# Patient Record
Sex: MALE | Race: BLACK OR AFRICAN AMERICAN | NOT HISPANIC OR LATINO | Employment: UNEMPLOYED | URBAN - METROPOLITAN AREA
[De-identification: names, ages, dates, MRNs, and addresses within clinical notes are randomized per-mention and may not be internally consistent; named-entity substitution may affect disease eponyms.]

---

## 2023-06-15 ENCOUNTER — NURSING HOME VISIT (OUTPATIENT)
Dept: POST ACUTE CARE | Facility: EXTERNAL LOCATION | Age: 53
End: 2023-06-15
Payer: MEDICAID

## 2023-06-15 DIAGNOSIS — I10 BENIGN ESSENTIAL HTN: ICD-10-CM

## 2023-06-15 DIAGNOSIS — Z79.899 ON DEEP VEIN THROMBOSIS (DVT) PROPHYLAXIS: ICD-10-CM

## 2023-06-15 DIAGNOSIS — Z98.1 ENCOUNTER FOR POSTOPERATIVE CARE RELATED TO SURGICAL JOINT FUSION: ICD-10-CM

## 2023-06-15 DIAGNOSIS — Z98.890 STATUS POST SURGICAL MANIPULATION OF ANKLE JOINT: ICD-10-CM

## 2023-06-15 DIAGNOSIS — G47.30 SLEEP APNEA, UNSPECIFIED TYPE: ICD-10-CM

## 2023-06-15 DIAGNOSIS — R26.89 IMPAIRED GAIT AND MOBILITY: ICD-10-CM

## 2023-06-15 DIAGNOSIS — K59.00 CONSTIPATION, UNSPECIFIED CONSTIPATION TYPE: ICD-10-CM

## 2023-06-15 DIAGNOSIS — J45.909 ASTHMA, UNSPECIFIED ASTHMA SEVERITY, UNSPECIFIED WHETHER COMPLICATED, UNSPECIFIED WHETHER PERSISTENT (HHS-HCC): ICD-10-CM

## 2023-06-15 DIAGNOSIS — E11.9 TYPE 2 DIABETES MELLITUS WITHOUT COMPLICATION, WITHOUT LONG-TERM CURRENT USE OF INSULIN (MULTI): ICD-10-CM

## 2023-06-15 DIAGNOSIS — E66.01 MORBID OBESITY (MULTI): ICD-10-CM

## 2023-06-15 DIAGNOSIS — M25.571 RIGHT ANKLE PAIN, UNSPECIFIED CHRONICITY: ICD-10-CM

## 2023-06-15 DIAGNOSIS — I48.92 ATRIAL FLUTTER, UNSPECIFIED TYPE (MULTI): ICD-10-CM

## 2023-06-15 DIAGNOSIS — M14.671 CHARCOT ANKLE, RIGHT: Primary | ICD-10-CM

## 2023-06-15 DIAGNOSIS — M62.81 GENERALIZED MUSCLE WEAKNESS: ICD-10-CM

## 2023-06-15 DIAGNOSIS — Z48.89 ENCOUNTER FOR POSTOPERATIVE CARE RELATED TO SURGICAL JOINT FUSION: ICD-10-CM

## 2023-06-15 DIAGNOSIS — Z74.09 IMMOBILITY: ICD-10-CM

## 2023-06-15 PROCEDURE — 99310 SBSQ NF CARE HIGH MDM 45: CPT | Performed by: NURSE PRACTITIONER

## 2023-06-15 NOTE — LETTER
Patient: Lazarus Truong  : 1970    Encounter Date: 06/15/2023    Name: Lazarus Truong    YOB: 1970    Code Status: Full Code    Chief Complaint:    Initial visit; new patient;  Right Charcot ankle; S/P Right Charcot ankle reconstruction with application of external fixator; etc.......        HPI   52 year old male with medical history of type 2 diabetes, hypertension, atrial flutter,  asthma, sleep apnea and right foot Charcot joints.    HOSPITAL COURSE:  Patient was admitted  to  on 2023 for short stay 2 to 4 days for right Charcot ankle reconstruction surgery with   application of external fixation device.  Patient was recently diagnosed with atrial flutter; cardiologist cleared him for   surgical intervention and recommended monitoring for a few days; due to patient's comorbidities and change in   ambulatory status short stay for 2 to 4 days with a consult for PT OT with recommendations to discharge to rehab or SNF.    Patient is status post right Charcot ankle reconstruction with application of external fixator on 2023.  Right talectomy, tibio calcaneal arthrodesis. Estimated blood loss was 30 ml.   Weightbearing status is nonweightbearing to right foot.  Dressing should be left on clean dry and intact.  Nursing may reinforce outer dressing if strikethrough is noted.  DVT prophylaxis was started he was started on heparin while in-house and it is recommended that he continue anticoagulation upon discharge   for at least 2 months duration given obesity and physical limitations.  It was also recommended the patient use incentive spirometer.    Patient admitted to [facility] for skilled services today 6/15/2023.    Right Charcot ankle; S/P Right Charcot ankle reconstruction with application of external fixator on 2023.  Right talectomy, tibio calcaneal arthrodesis (acquired deformity); right foot pain:   Weightbearing status is nonweightbearing to right  foot.  Dressing should be left on clean dry and intact.    Nursing may reinforce outer dressing if strikethrough is noted.  DVT prophylaxis start heparin while in-house and it is recommended that he continue anticoagulation upon discharge   for at least 2 months duration given obesity and physical limitations.  Patient has decreased strength and decreased endurance.  Moderate intensity rehab  recommended after discharge- at [facility] skilled services.  Patient seen today.  He is sitting up in his bed.   His operative site is wrapped and external fixation device is in place.   Calm, cooperative, talkative, pleasant.   No complaints of right foot pain today.  No complaints of chest pain, headaches or dizziness today.    Morbid obesity (severe due to excess calories):   Patient's weight is 403#.     Type 2 diabetes:   On metformin.  No recent Ha1c available.    Hypertension:  On lisinopril and metoprolol.   Recent /58.  No complaints of chest pain, headaches or dizziness.     Atrial flutter:  On metoprolol and Eliquis.  No complaints of palpitations.     Asthma and sleep apnea:  Not on any medications for asthma.  On RA, not on oxygen.  No oxygen desaturations reported.     DVT prophylaxis:   On Eliquis.    Constipation:   On Colace.  No complaints of constipation today.    Immobility; impaired gait and mobility; generalized muscle weakness:  Patient is nonweightbearing.  Patient at [facility] skilled services, PT/OT.         ROS: 10 point ROS performed; Negative unless noted in HPI.    Social history:  Non-smoker  No alcohol  No illicit drugs  Currently employed as a     Surgical history:  Tendon surgery.    Family history:  Heart disease    Reviewed  hospital records from recent hospitalization.  Reviewed  EMR  Reviewed medical, social, surgical and family history.  Reviewed all current medications and performed medication reconciliation.  Performed  "prescription drug management.  Reviewed vital signs AND lab results  Reviewed [facility] Documentation  Discussed patient with nursing.   Spent greater than 45 minutes on visit.  Ordered RX for Percocet.        Lab Results   Component Value Date    WBC 7.3 06/29/2020    HGB 13.5 06/29/2020    HCT 43.1 06/29/2020     06/29/2020    CHOL 174 06/29/2020    TRIG 77 06/29/2020    HDL 66.9 06/29/2020    ALT 16 06/29/2020    AST 18 06/29/2020     06/29/2020    K 4.1 06/29/2020     06/29/2020    CREATININE 1.11 06/29/2020    BUN 22 06/29/2020    CO2 28 06/29/2020       /58   Pulse 67   Temp 35.7 °C (96.3 °F)   Resp 18   Ht 1.765 m (5' 9.5\")   Wt (!) 183 kg (403 lb)   SpO2 98%   BMI 58.66 kg/m²        Past Medical History:   Diagnosis Date   • Encounter for other preprocedural examination 07/02/2017    Pre-operative clearance   • ST elevation (STEMI) myocardial infarction involving other coronary artery of anterior wall (CMS/HCC)     Anterior myocardial infarction   • Unspecified open wound, unspecified foot, initial encounter 06/20/2017    Open wound of foot       Vitals  /58   Pulse 67   Temp 35.7 °C (96.3 °F)   Resp 18   Ht 1.765 m (5' 9.5\")   Wt (!) 183 kg (403 lb)   SpO2 98%   BMI 58.66 kg/m²   Body mass index is 58.66 kg/m².    Physical Exam  Gen: Alert, NAD, Morbid Obesity.  HEENT:  PERRLA, EOMI, conjunctiva and sclera normal in appearance. External auditory canals/TMs normal; Oral cavity and posterior pharynx without lesions/exudate  Neck:  Supple with FROM; No masses/nodes palpable; Thyroid nontender and without nodules;  Respiratory:  Lungs CTAB  Cardiovascular:  Heart RRR. No M/R/G. Peripheral pulses equal bilaterally  Abdomen:  Soft, nontender, BS present throughout; No R/G/R; No HSM or masses palpated  Extremities: Muscle strength grossly normal with good tone; right ankle/foot wrapped with external fixation device attached-DDI;   right foot toes < 3 second cap " refill;  Adequate sensation in toes on right foot; generalized muscle weakness; immobility.   Neuro:  CN II-XII intact; Reflexes 2+/2+; Gross motor and sensory intact  Skin:  No suspicious lesions present      Assessment/Plan     Right Charcot ankle; S/P Right Charcot ankle reconstruction with application of external fixator on 6/8/2023.  Right talectomy, tibio calcaneal arthrodesis (acquired deformity); right foot pain:   Weightbearing status nonweightbearing to right foot.  Dressing should be left on clean dry and intact.    Nursing may reinforce outer dressing if strikethrough is noted.  Moderate intensity rehab  recommended after discharge- at [facility] for skilled services.  Elevate right lower extremity.    Morbid obesity (severe due to excess calories):   Patient's weight is 403#.     Type 2 diabetes:   Continue metformin.  Order Ha1c.     Hypertension:  Continue lisinopril and metoprolol.   Monitor Bps.     Atrial flutter:  Continue metoprolol and Eliquis.    Asthma and sleep apnea:  Monitor oxygen saturations.   Monitor for cough, SOB.  Start Proair inhaler and/or albuterol nebs if needed.     DVT prophylaxis:   Continue Eliquis.    Constipation:   Continue Colace.  Monitor for constipation.     Immobility; impaired gait and mobility; generalized muscle weakness:  Patient nonweightbearing.  Continue at  [facility] for skilled services, PT/OT.         Problem List Items Addressed This Visit    None  Visit Diagnoses       Charcot ankle, right    -  Primary    Status post surgical manipulation of ankle joint        Encounter for postoperative care related to surgical joint fusion        Right ankle pain, unspecified chronicity        Morbid obesity (CMS/[facility])        Type 2 diabetes mellitus without complication, without long-term current use of insulin (CMS/[facility])        Benign essential HTN        Atrial flutter, unspecified type (CMS/[facility])        Asthma, unspecified asthma severity,  unspecified whether complicated, unspecified whether persistent        Sleep apnea, unspecified type        On deep vein thrombosis (DVT) prophylaxis        Constipation, unspecified constipation type        Immobility        Impaired gait and mobility        Generalized muscle weakness                TAMARA Judd       Electronically Signed By: TAMARA Judd   6/18/23  8:41 PM

## 2023-06-18 VITALS
WEIGHT: 315 LBS | OXYGEN SATURATION: 98 % | SYSTOLIC BLOOD PRESSURE: 121 MMHG | HEIGHT: 70 IN | BODY MASS INDEX: 45.1 KG/M2 | TEMPERATURE: 96.3 F | DIASTOLIC BLOOD PRESSURE: 58 MMHG | RESPIRATION RATE: 18 BRPM | HEART RATE: 67 BPM

## 2023-06-18 NOTE — PROGRESS NOTES
Name: Lazarus Truong    YOB: 1970    Code Status: Full Code    Chief Complaint:    Initial visit; new patient;  Right Charcot ankle; S/P Right Charcot ankle reconstruction with application of external fixator; etc.......        HPI   52 year old male with medical history of type 2 diabetes, hypertension, atrial flutter,  asthma, sleep apnea and right foot Charcot joints.    HOSPITAL COURSE:  Patient was admitted  to  on 6/8/2023 for short stay 2 to 4 days for right Charcot ankle reconstruction surgery with   application of external fixation device.  Patient was recently diagnosed with atrial flutter; cardiologist cleared him for   surgical intervention and recommended monitoring for a few days; due to patient's comorbidities and change in   ambulatory status short stay for 2 to 4 days with a consult for PT OT with recommendations to discharge to rehab or SNF.    Patient is status post right Charcot ankle reconstruction with application of external fixator on 6/8/2023.  Right talectomy, tibio calcaneal arthrodesis. Estimated blood loss was 30 ml.   Weightbearing status is nonweightbearing to right foot.  Dressing should be left on clean dry and intact.  Nursing may reinforce outer dressing if strikethrough is noted.  DVT prophylaxis was started he was started on heparin while in-house and it is recommended that he continue anticoagulation upon discharge   for at least 2 months duration given obesity and physical limitations.  It was also recommended the patient use incentive spirometer.    Patient admitted to [facility] skilled services today 6/15/2023.    Right Charcot ankle; S/P Right Charcot ankle reconstruction with application of external fixator on 6/8/2023.  Right talectomy, tibio calcaneal arthrodesis (acquired deformity); right foot pain:   Weightbearing status is nonweightbearing to right foot.  Dressing should be left on clean dry and intact.    Nursing may reinforce outer  dressing if strikethrough is noted.  DVT prophylaxis start heparin while in-house and it is recommended that he continue anticoagulation upon discharge   for at least 2 months duration given obesity and physical limitations.  Patient has decreased strength and decreased endurance.  Moderate intensity rehab  recommended after discharge- at [facility] for skilled services.  Patient seen today.  He is sitting up in his bed.   His operative site is wrapped and external fixation device is in place.   Calm, cooperative, talkative, pleasant.   No complaints of right foot pain today.  No complaints of chest pain, headaches or dizziness today.    Morbid obesity (severe due to excess calories):   Patient's weight is 403#.     Type 2 diabetes:   On metformin.  No recent Ha1c available.    Hypertension:  On lisinopril and metoprolol.   Recent /58.  No complaints of chest pain, headaches or dizziness.     Atrial flutter:  On metoprolol and Eliquis.  No complaints of palpitations.     Asthma and sleep apnea:  Not on any medications for asthma.  On RA, not on oxygen.  No oxygen desaturations reported.     DVT prophylaxis:   On Eliquis.    Constipation:   On Colace.  No complaints of constipation today.    Immobility; impaired gait and mobility; generalized muscle weakness:  Patient is nonweightbearing.  Patient at [facility] for skilled services, PT/OT.         ROS: 10 point ROS performed; Negative unless noted in HPI.    Social history:  Non-smoker  No alcohol  No illicit drugs  Currently employed as a     Surgical history:  Tendon surgery.    Family history:  Heart disease    Reviewed  hospital records from recent hospitalization.  Reviewed  EMR  Reviewed medical, social, surgical and family history.  Reviewed all current medications and performed medication reconciliation.  Performed prescription drug management.  Reviewed vital signs AND lab results  Reviewed [facility]  "Documentation  Discussed patient with nursing.   Spent greater than 45 minutes on visit.  Ordered RX for Percocet.        Lab Results   Component Value Date    WBC 7.3 06/29/2020    HGB 13.5 06/29/2020    HCT 43.1 06/29/2020     06/29/2020    CHOL 174 06/29/2020    TRIG 77 06/29/2020    HDL 66.9 06/29/2020    ALT 16 06/29/2020    AST 18 06/29/2020     06/29/2020    K 4.1 06/29/2020     06/29/2020    CREATININE 1.11 06/29/2020    BUN 22 06/29/2020    CO2 28 06/29/2020       /58   Pulse 67   Temp 35.7 °C (96.3 °F)   Resp 18   Ht 1.765 m (5' 9.5\")   Wt (!) 183 kg (403 lb)   SpO2 98%   BMI 58.66 kg/m²        Past Medical History:   Diagnosis Date    Encounter for other preprocedural examination 07/02/2017    Pre-operative clearance    ST elevation (STEMI) myocardial infarction involving other coronary artery of anterior wall (CMS/HCC)     Anterior myocardial infarction    Unspecified open wound, unspecified foot, initial encounter 06/20/2017    Open wound of foot       Vitals  /58   Pulse 67   Temp 35.7 °C (96.3 °F)   Resp 18   Ht 1.765 m (5' 9.5\")   Wt (!) 183 kg (403 lb)   SpO2 98%   BMI 58.66 kg/m²   Body mass index is 58.66 kg/m².    Physical Exam  Gen: Alert, NAD, Morbid Obesity.  HEENT:  PERRLA, EOMI, conjunctiva and sclera normal in appearance. External auditory canals/TMs normal; Oral cavity and posterior pharynx without lesions/exudate  Neck:  Supple with FROM; No masses/nodes palpable; Thyroid nontender and without nodules;  Respiratory:  Lungs CTAB  Cardiovascular:  Heart RRR. No M/R/G. Peripheral pulses equal bilaterally  Abdomen:  Soft, nontender, BS present throughout; No R/G/R; No HSM or masses palpated  Extremities: Muscle strength grossly normal with good tone; right ankle/foot wrapped with external fixation device attached-DDI;   right foot toes < 3 second cap refill;  Adequate sensation in toes on right foot; generalized muscle weakness; immobility. "   Neuro:  CN II-XII intact; Reflexes 2+/2+; Gross motor and sensory intact  Skin:  No suspicious lesions present      Assessment/Plan      Right Charcot ankle; S/P Right Charcot ankle reconstruction with application of external fixator on 6/8/2023.  Right talectomy, tibio calcaneal arthrodesis (acquired deformity); right foot pain:   Weightbearing status nonweightbearing to right foot.  Dressing should be left on clean dry and intact.    Nursing may reinforce outer dressing if strikethrough is noted.  Moderate intensity rehab  recommended after discharge- at [facility] for skilled services.  Elevate right lower extremity.    Morbid obesity (severe due to excess calories):   Patient's weight is 403#.     Type 2 diabetes:   Continue metformin.  Order Ha1c.     Hypertension:  Continue lisinopril and metoprolol.   Monitor Bps.     Atrial flutter:  Continue metoprolol and Eliquis.    Asthma and sleep apnea:  Monitor oxygen saturations.   Monitor for cough, SOB.  Start Proair inhaler and/or albuterol nebs if needed.     DVT prophylaxis:   Continue Eliquis.    Constipation:   Continue Colace.  Monitor for constipation.     Immobility; impaired gait and mobility; generalized muscle weakness:  Patient nonweightbearing.  Continue at  [facility] for skilled services, PT/OT.         Problem List Items Addressed This Visit    None  Visit Diagnoses       Charcot ankle, right    -  Primary    Status post surgical manipulation of ankle joint        Encounter for postoperative care related to surgical joint fusion        Right ankle pain, unspecified chronicity        Morbid obesity (CMS/[facility])        Type 2 diabetes mellitus without complication, without long-term current use of insulin (CMS/[facility])        Benign essential HTN        Atrial flutter, unspecified type (CMS/[facility])        Asthma, unspecified asthma severity, unspecified whether complicated, unspecified whether persistent        Sleep apnea,  unspecified type        On deep vein thrombosis (DVT) prophylaxis        Constipation, unspecified constipation type        Immobility        Impaired gait and mobility        Generalized muscle weakness                Komal Burnette, APRN-CNP

## 2023-06-20 ENCOUNTER — NURSING HOME VISIT (OUTPATIENT)
Dept: POST ACUTE CARE | Facility: EXTERNAL LOCATION | Age: 53
End: 2023-06-20
Payer: MEDICAID

## 2023-06-20 DIAGNOSIS — Z48.89 ENCOUNTER FOR POSTOPERATIVE CARE RELATED TO SURGICAL JOINT FUSION: ICD-10-CM

## 2023-06-20 DIAGNOSIS — I48.92 ATRIAL FLUTTER, UNSPECIFIED TYPE (MULTI): ICD-10-CM

## 2023-06-20 DIAGNOSIS — Z74.09 IMMOBILITY: ICD-10-CM

## 2023-06-20 DIAGNOSIS — K59.00 CONSTIPATION, UNSPECIFIED CONSTIPATION TYPE: ICD-10-CM

## 2023-06-20 DIAGNOSIS — R19.7 DIARRHEA, UNSPECIFIED TYPE: ICD-10-CM

## 2023-06-20 DIAGNOSIS — G47.30 SLEEP APNEA, UNSPECIFIED TYPE: ICD-10-CM

## 2023-06-20 DIAGNOSIS — E11.9 TYPE 2 DIABETES MELLITUS WITHOUT COMPLICATION, WITHOUT LONG-TERM CURRENT USE OF INSULIN (MULTI): ICD-10-CM

## 2023-06-20 DIAGNOSIS — Z79.899 ON DEEP VEIN THROMBOSIS (DVT) PROPHYLAXIS: ICD-10-CM

## 2023-06-20 DIAGNOSIS — M25.571 RIGHT ANKLE PAIN, UNSPECIFIED CHRONICITY: ICD-10-CM

## 2023-06-20 DIAGNOSIS — I10 BENIGN ESSENTIAL HTN: Primary | ICD-10-CM

## 2023-06-20 DIAGNOSIS — Z98.1 ENCOUNTER FOR POSTOPERATIVE CARE RELATED TO SURGICAL JOINT FUSION: ICD-10-CM

## 2023-06-20 DIAGNOSIS — R26.89 IMPAIRED GAIT AND MOBILITY: ICD-10-CM

## 2023-06-20 DIAGNOSIS — J45.909 ASTHMA, UNSPECIFIED ASTHMA SEVERITY, UNSPECIFIED WHETHER COMPLICATED, UNSPECIFIED WHETHER PERSISTENT (HHS-HCC): ICD-10-CM

## 2023-06-20 DIAGNOSIS — Z98.890 STATUS POST SURGICAL MANIPULATION OF ANKLE JOINT: ICD-10-CM

## 2023-06-20 DIAGNOSIS — M62.81 GENERALIZED MUSCLE WEAKNESS: ICD-10-CM

## 2023-06-20 DIAGNOSIS — M14.671 CHARCOT ANKLE, RIGHT: ICD-10-CM

## 2023-06-20 DIAGNOSIS — E66.01 MORBID OBESITY (MULTI): ICD-10-CM

## 2023-06-20 PROCEDURE — 99309 SBSQ NF CARE MODERATE MDM 30: CPT | Performed by: NURSE PRACTITIONER

## 2023-06-20 NOTE — LETTER
Patient: Lazarus Truong  : 1970    Encounter Date: 2023    Name: Lazarus Truong    YOB: 1970    Code Status: Full Code    Chief Complaint:    Benign essential HTN;  etc.......        HPI   52 year old male with medical history of type 2 diabetes, hypertension, atrial flutter,  asthma, sleep apnea and right foot Charcot joints.    Patient admitted to [facility] for skilled services today 6/15/2023.    Benign essential Hypertension:  On lisinopril and metoprolol.   Recent /68.  No complaints of chest pain, headaches or dizziness.   Patient seen today.  He is up in his wheelchair.  Calm, cooperative, talkative, pleasant.   No complaints of chest pain, headaches or dizziness today.  A x O x 3.      Atrial flutter:  On metoprolol and Eliquis.  No complaints of palpitations.   Patient was recently diagnosed with a-flutter during his hospitalization.  No complaints of chest pain.   No dizziness.    Right Charcot ankle; S/P Right Charcot ankle reconstruction with application of external fixator on 2023.  Right talectomy, tibio calcaneal arthrodesis (acquired deformity); right foot pain:   Weightbearing status is nonweightbearing to right foot.  Dressing should be left on clean dry and intact.    At [facility] for skilled services.  His operative site is wrapped and external fixation device is in place.   No complaints of right foot pain today.    Morbid obesity (severe due to excess calories):   Patient's weight is 403#.     Type 2 diabetes; diarrhea:   On metformin.  No recent Ha1c available.  Patient states he is having some diarrhea, he thinks it might be related to his metformin.   Reminded patient to not take any Colace if he is having diarrhea.  He is not having any vomiting or nausea.     Asthma; sleep apnea:  Not on any medications for asthma.  On RA, not on oxygen.  No oxygen desaturations reported.   No increased SOB reported.    DVT prophylaxis:  "  On Eliquis.    Constipation:   On Colace PRN.  No complaints of constipation today.    Impaired gait and mobility; Immobility; generalized muscle weakness:  Patient is nonweightbearing.  Patient at [facility] for skilled services, PT/OT.         ROS: 10 point ROS performed; Negative unless noted in HPI.    Social history:  Non-smoker  No alcohol  No illicit drugs  Currently employed as a     Surgical history:  Tendon surgery.    Family history:  Heart disease    Reviewed  hospital records from recent hospitalization.  Reviewed  EMR  Reviewed medical, social, surgical and family history.  Reviewed all current medications and performed medication reconciliation.  Performed prescription drug management.  Reviewed vital signs AND lab results  Reviewed [facility] Documentation  Discussed patient with nursing.        Lab Results   Component Value Date    WBC 7.3 06/29/2020    HGB 13.5 06/29/2020    HCT 43.1 06/29/2020     06/29/2020    CHOL 174 06/29/2020    TRIG 77 06/29/2020    HDL 66.9 06/29/2020    ALT 16 06/29/2020    AST 18 06/29/2020     06/29/2020    K 4.1 06/29/2020     06/29/2020    CREATININE 1.11 06/29/2020    BUN 22 06/29/2020    CO2 28 06/29/2020       /68   Pulse 84   Temp 36.2 °C (97.2 °F)   Ht 1.765 m (5' 9.5\")   Wt (!) 183 kg (403 lb)   BMI 58.66 kg/m²        Past Medical History:   Diagnosis Date   • Encounter for other preprocedural examination 07/02/2017    Pre-operative clearance   • ST elevation (STEMI) myocardial infarction involving other coronary artery of anterior wall (CMS/HCC)     Anterior myocardial infarction   • Unspecified open wound, unspecified foot, initial encounter 06/20/2017    Open wound of foot       Vitals  /68   Pulse 84   Temp 36.2 °C (97.2 °F)   Ht 1.765 m (5' 9.5\")   Wt (!) 183 kg (403 lb)   BMI 58.66 kg/m²   Body mass index is 58.66 kg/m².    Physical Exam  Gen: Alert, NAD, Morbid Obesity.  HEENT: "  PERRLA, EOMI, conjunctiva and sclera normal in appearance. External auditory canals/TMs normal; Oral cavity and posterior pharynx without lesions/exudate  Neck:  Supple with FROM; No masses/nodes palpable; Thyroid nontender and without nodules;  Respiratory:  Lungs CTAB  Cardiovascular:  Heart RRR. No M/R/G. Peripheral pulses equal bilaterally  Abdomen:  Soft, nontender, BS present throughout; No R/G/R; No HSM or masses palpated  Extremities: Muscle strength grossly normal with good tone; right ankle/foot wrapped with external fixation device attached-DDI;   right foot toes < 3 second cap refill;  Adequate sensation in toes on right foot; generalized muscle weakness; immobility.   Neuro:  CN II-XII intact; Reflexes 2+/2+; Gross motor and sensory intact  Skin:  No suspicious lesions present      Assessment/Plan     Recent labs pending----follow up when available.     Benign essential Hypertension:  Continue lisinopril and metoprolol.   Monitor Bps.    Atrial flutter:  Continue metoprolol and Eliquis.  Follow up with cardiology as planned.     Right Charcot ankle; S/P Right Charcot ankle reconstruction with application of external fixator on 6/8/2023.  Right talectomy, tibio calcaneal arthrodesis (acquired deformity); right foot pain:   Weightbearing status is nonweightbearing to right foot.  Dressing should be left on clean dry and intact.    Elevate right leg as tolerated.   Continue at  [facility] for skilled services.   Follow up with orthopedic doctor as planned.     Morbid obesity (severe due to excess calories):   Monitor weights.    Type 2 diabetes; diarrhea:   Continue metformin.  Order Ha1c   If diarrhea not improved in 1-2 days, reduce metformin dose.   Reminded patient to not take any Colace if he is having diarrhea.    Asthma; sleep apnea:  Monitor oxygen sats.  Start oxygen via NC 2L for sats < 93%  Start Proair inhaler for cough, wheezing, dyspnea.     DVT prophylaxis:   Continue  "Eliquis.    Constipation:   Continue Colace PRN.  No complaints of constipation today.    Impaired gait and mobility; Immobility;generalized muscle weakness:  Patient is nonweightbearing.  Patient at [facility] services, PT/OT.   Fall prevention strategies.       Problem List Items Addressed This Visit    None  Visit Diagnoses       Benign essential HTN    -  Primary    Atrial flutter, unspecified type (CMS/HCC)        Charcot ankle, right        Status post surgical manipulation of ankle joint        Encounter for postoperative care related to surgical joint fusion        Right ankle pain, unspecified chronicity        Morbid obesity (CMS/[facility])        Type 2 diabetes mellitus without complication, without long-term current use of insulin (CMS/[facility])        Diarrhea, unspecified type        Asthma, unspecified asthma severity, unspecified whether complicated, unspecified whether persistent        Sleep apnea, unspecified type        On deep vein thrombosis (DVT) prophylaxis        Constipation, unspecified constipation type        Impaired gait and mobility        Immobility        Generalized muscle weakness                                      ROS: 10 point ROS performed; Negative unless noted in HPI.       Lab Results   Component Value Date    WBC 7.3 06/29/2020    HGB 13.5 06/29/2020    HCT 43.1 06/29/2020     06/29/2020    CHOL 174 06/29/2020    TRIG 77 06/29/2020    HDL 66.9 06/29/2020    ALT 16 06/29/2020    AST 18 06/29/2020     06/29/2020    K 4.1 06/29/2020     06/29/2020    CREATININE 1.11 06/29/2020    BUN 22 06/29/2020    CO2 28 06/29/2020             /68   Pulse 84   Temp 36.2 °C (97.2 °F)   Ht 1.765 m (5' 9.5\")   Wt (!) 183 kg (403 lb)   BMI 58.66 kg/m²      Physical Exam     Assessment/Plan   Problem List Items Addressed This Visit    None  Visit Diagnoses       Benign essential HTN    -  Primary    Atrial flutter, unspecified type (CMS/HCC)        " Charcot ankle, right        Status post surgical manipulation of ankle joint        Encounter for postoperative care related to surgical joint fusion        Right ankle pain, unspecified chronicity        Morbid obesity (CMS/[facility])        Type 2 diabetes mellitus without complication, without long-term current use of insulin (CMS/[facility])        Diarrhea, unspecified type        Asthma, unspecified asthma severity, unspecified whether complicated, unspecified whether persistent        Sleep apnea, unspecified type        On deep vein thrombosis (DVT) prophylaxis        Constipation, unspecified constipation type        Impaired gait and mobility        Immobility        Generalized muscle weakness                TAMARA Judd       Electronically Signed By: TAMARA Judd   6/23/23  5:31 PM

## 2023-06-23 VITALS
WEIGHT: 315 LBS | BODY MASS INDEX: 45.1 KG/M2 | HEIGHT: 70 IN | SYSTOLIC BLOOD PRESSURE: 110 MMHG | HEART RATE: 84 BPM | DIASTOLIC BLOOD PRESSURE: 68 MMHG | TEMPERATURE: 97.2 F

## 2023-06-23 NOTE — PROGRESS NOTES
Name: Lazarus Truong    YOB: 1970    Code Status: Full Code    Chief Complaint:    Benign essential HTN;  etc.......        HPI   52 year old male with medical history of type 2 diabetes, hypertension, atrial flutter,  asthma, sleep apnea and right foot Charcot joints.    Patient admitted to [facility] for skilled services today 6/15/2023.    Benign essential Hypertension:  On lisinopril and metoprolol.   Recent /68.  No complaints of chest pain, headaches or dizziness.   Patient seen today.  He is up in his wheelchair.  Calm, cooperative, talkative, pleasant.   No complaints of chest pain, headaches or dizziness today.  A x O x 3.      Atrial flutter:  On metoprolol and Eliquis.  No complaints of palpitations.   Patient was recently diagnosed with a-flutter during his hospitalization.  No complaints of chest pain.   No dizziness.    Right Charcot ankle; S/P Right Charcot ankle reconstruction with application of external fixator on 6/8/2023.  Right talectomy, tibio calcaneal arthrodesis (acquired deformity); right foot pain:   Weightbearing status is nonweightbearing to right foot.  Dressing should be left on clean dry and intact.    At [facility] for skilled services.  His operative site is wrapped and external fixation device is in place.   No complaints of right foot pain today.    Morbid obesity (severe due to excess calories):   Patient's weight is 403#.     Type 2 diabetes; diarrhea:   On metformin.  No recent Ha1c available.  Patient states he is having some diarrhea, he thinks it might be related to his metformin.   Reminded patient to not take any Colace if he is having diarrhea.  He is not having any vomiting or nausea.     Asthma; sleep apnea:  Not on any medications for asthma.  On RA, not on oxygen.  No oxygen desaturations reported.   No increased SOB reported.    DVT prophylaxis:   On Eliquis.    Constipation:   On Colace PRN.  No complaints of  "constipation today.    Impaired gait and mobility; Immobility; generalized muscle weakness:  Patient is nonweightbearing.  Patient at [facility] for [facility] services, PT/OT.         ROS: 10 point ROS performed; Negative unless noted in HPI.    Social history:  Non-smoker  No alcohol  No illicit drugs  Currently employed as a     Surgical history:  Tendon surgery.    Family history:  Heart disease    Reviewed  hospital records from recent hospitalization.  Reviewed  EMR  Reviewed medical, social, surgical and family history.  Reviewed all current medications and performed medication reconciliation.  Performed prescription drug management.  Reviewed vital signs AND lab results  Reviewed [facility] Documentation  Discussed patient with nursing.        Lab Results   Component Value Date    WBC 7.3 06/29/2020    HGB 13.5 06/29/2020    HCT 43.1 06/29/2020     06/29/2020    CHOL 174 06/29/2020    TRIG 77 06/29/2020    HDL 66.9 06/29/2020    ALT 16 06/29/2020    AST 18 06/29/2020     06/29/2020    K 4.1 06/29/2020     06/29/2020    CREATININE 1.11 06/29/2020    BUN 22 06/29/2020    CO2 28 06/29/2020       /68   Pulse 84   Temp 36.2 °C (97.2 °F)   Ht 1.765 m (5' 9.5\")   Wt (!) 183 kg (403 lb)   BMI 58.66 kg/m²        Past Medical History:   Diagnosis Date    Encounter for other preprocedural examination 07/02/2017    Pre-operative clearance    ST elevation (STEMI) myocardial infarction involving other coronary artery of anterior wall (CMS/HCC)     Anterior myocardial infarction    Unspecified open wound, unspecified foot, initial encounter 06/20/2017    Open wound of foot       Vitals  /68   Pulse 84   Temp 36.2 °C (97.2 °F)   Ht 1.765 m (5' 9.5\")   Wt (!) 183 kg (403 lb)   BMI 58.66 kg/m²   Body mass index is 58.66 kg/m².    Physical Exam  Gen: Alert, NAD, Morbid Obesity.  HEENT:  PERRLA, EOMI, conjunctiva and sclera normal in appearance. External " auditory canals/TMs normal; Oral cavity and posterior pharynx without lesions/exudate  Neck:  Supple with FROM; No masses/nodes palpable; Thyroid nontender and without nodules;  Respiratory:  Lungs CTAB  Cardiovascular:  Heart RRR. No M/R/G. Peripheral pulses equal bilaterally  Abdomen:  Soft, nontender, BS present throughout; No R/G/R; No HSM or masses palpated  Extremities: Muscle strength grossly normal with good tone; right ankle/foot wrapped with external fixation device attached-DDI;   right foot toes < 3 second cap refill;  Adequate sensation in toes on right foot; generalized muscle weakness; immobility.   Neuro:  CN II-XII intact; Reflexes 2+/2+; Gross motor and sensory intact  Skin:  No suspicious lesions present      Assessment/Plan      Recent labs pending----follow up when available.     Benign essential Hypertension:  Continue lisinopril and metoprolol.   Monitor Bps.    Atrial flutter:  Continue metoprolol and Eliquis.  Follow up with cardiology as planned.     Right Charcot ankle; S/P Right Charcot ankle reconstruction with application of external fixator on 6/8/2023.  Right talectomy, tibio calcaneal arthrodesis (acquired deformity); right foot pain:   Weightbearing status is nonweightbearing to right foot.  Dressing should be left on clean dry and intact.    Elevate right leg as tolerated.   Continue at  [facility] for skilled services.   Follow up with orthopedic doctor as planned.     Morbid obesity (severe due to excess calories):   Monitor weights.    Type 2 diabetes; diarrhea:   Continue metformin.  Order Ha1c   If diarrhea not improved in 1-2 days, reduce metformin dose.   Reminded patient to not take any Colace if he is having diarrhea.    Asthma; sleep apnea:  Monitor oxygen sats.  Start oxygen via NC 2L for sats < 93%  Start Proair inhaler for cough, wheezing, dyspnea.     DVT prophylaxis:   Continue Eliquis.    Constipation:   Continue Colace PRN.  No complaints of  "constipation today.    Impaired gait and mobility; Immobility;generalized muscle weakness:  Patient is nonweightbearing.  Patient at [facility], PT/OT.   Fall prevention strategies.       Problem List Items Addressed This Visit    None  Visit Diagnoses       Benign essential HTN    -  Primary    Atrial flutter, unspecified type (CMS/[facility])        Charcot ankle, right        Status post surgical manipulation of ankle joint        Encounter for postoperative care related to surgical joint fusion        Right ankle pain, unspecified chronicity        Morbid obesity (CMS/[facility])        Type 2 diabetes mellitus without complication, without long-term current use of insulin (CMS/[facility])        Diarrhea, unspecified type        Asthma, unspecified asthma severity, unspecified whether complicated, unspecified whether persistent        Sleep apnea, unspecified type        On deep vein thrombosis (DVT) prophylaxis        Constipation, unspecified constipation type        Impaired gait and mobility        Immobility        Generalized muscle weakness                                      ROS: 10 point ROS performed; Negative unless noted in HPI.       Lab Results   Component Value Date    WBC 7.3 06/29/2020    HGB 13.5 06/29/2020    HCT 43.1 06/29/2020     06/29/2020    CHOL 174 06/29/2020    TRIG 77 06/29/2020    HDL 66.9 06/29/2020    ALT 16 06/29/2020    AST 18 06/29/2020     06/29/2020    K 4.1 06/29/2020     06/29/2020    CREATININE 1.11 06/29/2020    BUN 22 06/29/2020    CO2 28 06/29/2020             /68   Pulse 84   Temp 36.2 °C (97.2 °F)   Ht 1.765 m (5' 9.5\")   Wt (!) 183 kg (403 lb)   BMI 58.66 kg/m²      Physical Exam     Assessment/Plan    Problem List Items Addressed This Visit    None  Visit Diagnoses       Benign essential HTN    -  Primary    Atrial flutter, unspecified type (CMS/[facility])        Charcot ankle, right        Status post surgical manipulation of " ankle joint        Encounter for postoperative care related to surgical joint fusion        Right ankle pain, unspecified chronicity        Morbid obesity (CMS/[facility])        Type 2 diabetes mellitus without complication, without long-term current use of insulin (CMS/[facility])        Diarrhea, unspecified type        Asthma, unspecified asthma severity, unspecified whether complicated, unspecified whether persistent        Sleep apnea, unspecified type        On deep vein thrombosis (DVT) prophylaxis        Constipation, unspecified constipation type        Impaired gait and mobility        Immobility        Generalized muscle weakness                Komal Burnette, APRN-CNP

## 2023-08-10 ENCOUNTER — HOSPITAL ENCOUNTER (OUTPATIENT)
Dept: DATA CONVERSION | Facility: HOSPITAL | Age: 53
Discharge: HOME | End: 2023-08-10

## 2023-08-10 DIAGNOSIS — M21.6X1 OTHER ACQUIRED DEFORMITIES OF RIGHT FOOT: ICD-10-CM

## 2023-08-25 ENCOUNTER — HOSPITAL ENCOUNTER (OUTPATIENT)
Dept: DATA CONVERSION | Facility: HOSPITAL | Age: 53
Discharge: HOME | End: 2023-08-25
Payer: MEDICAID

## 2023-08-25 DIAGNOSIS — M14.671 CHARCOT'S JOINT, RIGHT ANKLE AND FOOT: ICD-10-CM

## 2023-09-11 ENCOUNTER — HOSPITAL ENCOUNTER (OUTPATIENT)
Dept: DATA CONVERSION | Facility: HOSPITAL | Age: 53
End: 2023-09-11

## 2023-09-11 DIAGNOSIS — M21.6X1 OTHER ACQUIRED DEFORMITIES OF RIGHT FOOT: ICD-10-CM

## 2023-09-11 DIAGNOSIS — M14.671 CHARCOT'S JOINT, RIGHT ANKLE AND FOOT: ICD-10-CM

## 2023-09-11 DIAGNOSIS — T84.84XD PAIN DUE TO INTERNAL ORTHOPEDIC PROSTHETIC DEVICES, IMPLANTS AND GRAFTS, SUBSEQUENT ENCOUNTER: ICD-10-CM

## 2023-11-06 NOTE — DISCHARGE INSTRUCTIONS
PODIATRIC SURGERY POST-OP INSTRUCTIONS    YOU HAD ANESTHESIA:  You must have a responsible adult drive you home and stay with you for the first 24 hours.    Do not drive a car or drink any alcohol for 24 hours after surgery.  Do not do any strenuous work or activity for the next 24 hours.  You may have mild nausea, a sore throat or raspy voice for a few days.      POST-OP INSTRUCTIONS:  Keep dressing clean, dry and intact until your first post-operative appointment.  Do not remove surgical dressing. You may need to loosen if necessary.   Do not shower unless using a cast protector to protect dressing.  If dressing gets wet, please call office immediately as this can lead to increased risk of infection or wound dehiscence.   Elevate surgical extremity as much as possible to help with pain and swelling.  Place ice pack behind knee of surgical extremity (20 minutes on/20 minutes off while awake). After 24 hours use ice behind the knee as needed for comfort.  Weight Bearing Status: Please remain non-weight bearing to the surgical extremity with compression dressing in place utilizing wheelchair.  Please resume all home medications.   Post-Operative Medications: You have Percocet prescribed to you for pain control; please take as directed on the bottle.   Post-operative appointment with Dr. Savage for 11/16/23 @ 9:30 AM at the [place] office. Please call to schedule if not already scheduled.  Should any problems, questions, or concerns arise, please call the clinic office.    WHEN TO CALL YOUR DOCTOR:  Fever (>100.4°F or 38.0°C) or chills.  Incision problems such as redness, warmth, swelling, or foul smelling drainage.  Severe nausea or persistent vomiting.  Pain and swelling in your legs, especially if it is only on one side and not the other.  Pain with urination, cloudy urine, or foul smelling urine.  Or if you have any other problems or questions.  CALL 911 or go to the ED if you have any shortness of breath,  difficulty breathing, or chest pain.

## 2023-11-08 ENCOUNTER — ANESTHESIA EVENT (OUTPATIENT)
Dept: OPERATING ROOM | Facility: HOSPITAL | Age: 53
End: 2023-11-08
Payer: MEDICAID

## 2023-11-08 ENCOUNTER — HOSPITAL ENCOUNTER (OUTPATIENT)
Facility: HOSPITAL | Age: 53
Setting detail: OUTPATIENT SURGERY
Discharge: HOME | End: 2023-11-08
Attending: PODIATRIST | Admitting: PODIATRIST
Payer: MEDICAID

## 2023-11-08 ENCOUNTER — ANESTHESIA (OUTPATIENT)
Dept: OPERATING ROOM | Facility: HOSPITAL | Age: 53
End: 2023-11-08
Payer: MEDICAID

## 2023-11-08 VITALS
HEART RATE: 83 BPM | OXYGEN SATURATION: 100 % | DIASTOLIC BLOOD PRESSURE: 59 MMHG | TEMPERATURE: 97.7 F | SYSTOLIC BLOOD PRESSURE: 103 MMHG | RESPIRATION RATE: 16 BRPM

## 2023-11-08 PROBLEM — I48.91 ATRIAL FIBRILLATION (MULTI): Status: ACTIVE | Noted: 2023-11-08

## 2023-11-08 PROBLEM — E66.9 OBESITY: Status: ACTIVE | Noted: 2023-11-08

## 2023-11-08 PROBLEM — J45.909 ASTHMA (HHS-HCC): Status: ACTIVE | Noted: 2023-11-08

## 2023-11-08 PROBLEM — I25.10 CAD (CORONARY ARTERY DISEASE): Status: ACTIVE | Noted: 2023-11-08

## 2023-11-08 PROBLEM — E10.9 DIABETES MELLITUS TYPE 1 (MULTI): Status: ACTIVE | Noted: 2023-11-08

## 2023-11-08 LAB — GLUCOSE BLD MANUAL STRIP-MCNC: 128 MG/DL (ref 74–99)

## 2023-11-08 PROCEDURE — A20694 PR REMOVE EXTERN BONE FIX DEV W ANESTH: Performed by: NURSE ANESTHETIST, CERTIFIED REGISTERED

## 2023-11-08 PROCEDURE — 7100000001 HC RECOVERY ROOM TIME - INITIAL BASE CHARGE: Performed by: PODIATRIST

## 2023-11-08 PROCEDURE — 7100000010 HC PHASE TWO TIME - EACH INCREMENTAL 1 MINUTE: Performed by: PODIATRIST

## 2023-11-08 PROCEDURE — 2500000001 HC RX 250 WO HCPCS SELF ADMINISTERED DRUGS (ALT 637 FOR MEDICARE OP): Performed by: ANESTHESIOLOGY

## 2023-11-08 PROCEDURE — A20694 PR REMOVE EXTERN BONE FIX DEV W ANESTH: Performed by: ANESTHESIOLOGY

## 2023-11-08 PROCEDURE — 2500000004 HC RX 250 GENERAL PHARMACY W/ HCPCS (ALT 636 FOR OP/ED)

## 2023-11-08 PROCEDURE — 7100000009 HC PHASE TWO TIME - INITIAL BASE CHARGE: Performed by: PODIATRIST

## 2023-11-08 PROCEDURE — 3700000001 HC GENERAL ANESTHESIA TIME - INITIAL BASE CHARGE: Performed by: PODIATRIST

## 2023-11-08 PROCEDURE — 3700000002 HC GENERAL ANESTHESIA TIME - EACH INCREMENTAL 1 MINUTE: Performed by: PODIATRIST

## 2023-11-08 PROCEDURE — 3600000003 HC OR TIME - INITIAL BASE CHARGE - PROCEDURE LEVEL THREE: Performed by: PODIATRIST

## 2023-11-08 PROCEDURE — 7100000002 HC RECOVERY ROOM TIME - EACH INCREMENTAL 1 MINUTE: Performed by: PODIATRIST

## 2023-11-08 PROCEDURE — 3600000008 HC OR TIME - EACH INCREMENTAL 1 MINUTE - PROCEDURE LEVEL THREE: Performed by: PODIATRIST

## 2023-11-08 PROCEDURE — 2500000001 HC RX 250 WO HCPCS SELF ADMINISTERED DRUGS (ALT 637 FOR MEDICARE OP): Performed by: PODIATRIST

## 2023-11-08 PROCEDURE — 82947 ASSAY GLUCOSE BLOOD QUANT: CPT

## 2023-11-08 PROCEDURE — 2500000004 HC RX 250 GENERAL PHARMACY W/ HCPCS (ALT 636 FOR OP/ED): Performed by: NURSE ANESTHETIST, CERTIFIED REGISTERED

## 2023-11-08 PROCEDURE — 2720000007 HC OR 272 NO HCPCS: Performed by: PODIATRIST

## 2023-11-08 RX ORDER — DIPHENHYDRAMINE HYDROCHLORIDE 50 MG/ML
12.5 INJECTION INTRAMUSCULAR; INTRAVENOUS ONCE AS NEEDED
Status: DISCONTINUED | OUTPATIENT
Start: 2023-11-08 | End: 2023-11-08 | Stop reason: HOSPADM

## 2023-11-08 RX ORDER — DOCUSATE SODIUM 100 MG/1
100 CAPSULE, LIQUID FILLED ORAL 2 TIMES DAILY PRN
COMMUNITY

## 2023-11-08 RX ORDER — GUAIFENESIN 100 MG/5ML
200 SOLUTION ORAL 3 TIMES DAILY PRN
Status: ON HOLD | COMMUNITY
End: 2024-01-10 | Stop reason: ALTCHOICE

## 2023-11-08 RX ORDER — BACITRACIN 500 [USP'U]/G
OINTMENT TOPICAL AS NEEDED
Status: DISCONTINUED | OUTPATIENT
Start: 2023-11-08 | End: 2023-11-08 | Stop reason: HOSPADM

## 2023-11-08 RX ORDER — LISINOPRIL 5 MG/1
5 TABLET ORAL DAILY
COMMUNITY

## 2023-11-08 RX ORDER — FENTANYL CITRATE 50 UG/ML
25 INJECTION, SOLUTION INTRAMUSCULAR; INTRAVENOUS EVERY 5 MIN PRN
Status: DISCONTINUED | OUTPATIENT
Start: 2023-11-08 | End: 2023-11-08 | Stop reason: HOSPADM

## 2023-11-08 RX ORDER — MIDAZOLAM HYDROCHLORIDE 1 MG/ML
INJECTION, SOLUTION INTRAMUSCULAR; INTRAVENOUS AS NEEDED
Status: DISCONTINUED | OUTPATIENT
Start: 2023-11-08 | End: 2023-11-08

## 2023-11-08 RX ORDER — OXYCODONE AND ACETAMINOPHEN 5; 325 MG/1; MG/1
1 TABLET ORAL EVERY 4 HOURS PRN
Status: ON HOLD | COMMUNITY
End: 2024-01-10 | Stop reason: ALTCHOICE

## 2023-11-08 RX ORDER — METOCLOPRAMIDE 10 MG/1
10 TABLET ORAL ONCE
Status: COMPLETED | OUTPATIENT
Start: 2023-11-08 | End: 2023-11-08

## 2023-11-08 RX ORDER — METFORMIN HYDROCHLORIDE EXTENDED-RELEASE TABLETS 500 MG/1
500 TABLET, FILM COATED, EXTENDED RELEASE ORAL
COMMUNITY

## 2023-11-08 RX ORDER — HYDRALAZINE HYDROCHLORIDE 20 MG/ML
5 INJECTION INTRAMUSCULAR; INTRAVENOUS EVERY 30 MIN PRN
Status: DISCONTINUED | OUTPATIENT
Start: 2023-11-08 | End: 2023-11-08 | Stop reason: HOSPADM

## 2023-11-08 RX ORDER — PROPOFOL 10 MG/ML
INJECTION, EMULSION INTRAVENOUS AS NEEDED
Status: DISCONTINUED | OUTPATIENT
Start: 2023-11-08 | End: 2023-11-08

## 2023-11-08 RX ORDER — FAMOTIDINE 20 MG/1
20 TABLET, FILM COATED ORAL ONCE
Status: COMPLETED | OUTPATIENT
Start: 2023-11-08 | End: 2023-11-08

## 2023-11-08 RX ORDER — CALCIUM CARBONATE 200(500)MG
1 TABLET,CHEWABLE ORAL DAILY
Status: ON HOLD | COMMUNITY
End: 2024-01-10 | Stop reason: ALTCHOICE

## 2023-11-08 RX ORDER — CLINDAMYCIN PHOSPHATE 600 MG/50ML
600 INJECTION, SOLUTION INTRAVENOUS ONCE
Status: COMPLETED | OUTPATIENT
Start: 2023-11-08 | End: 2023-11-08

## 2023-11-08 RX ORDER — OXYCODONE HYDROCHLORIDE 5 MG/1
5 TABLET ORAL EVERY 4 HOURS PRN
Status: DISCONTINUED | OUTPATIENT
Start: 2023-11-08 | End: 2023-11-08 | Stop reason: HOSPADM

## 2023-11-08 RX ORDER — ALBUTEROL SULFATE 0.83 MG/ML
2.5 SOLUTION RESPIRATORY (INHALATION) ONCE AS NEEDED
Status: DISCONTINUED | OUTPATIENT
Start: 2023-11-08 | End: 2023-11-08 | Stop reason: HOSPADM

## 2023-11-08 RX ORDER — IPRATROPIUM BROMIDE 0.5 MG/2.5ML
500 SOLUTION RESPIRATORY (INHALATION) ONCE
Status: DISCONTINUED | OUTPATIENT
Start: 2023-11-08 | End: 2023-11-08 | Stop reason: HOSPADM

## 2023-11-08 RX ORDER — ONDANSETRON HYDROCHLORIDE 2 MG/ML
4 INJECTION, SOLUTION INTRAVENOUS ONCE AS NEEDED
Status: DISCONTINUED | OUTPATIENT
Start: 2023-11-08 | End: 2023-11-08 | Stop reason: HOSPADM

## 2023-11-08 RX ORDER — FENTANYL CITRATE 50 UG/ML
INJECTION, SOLUTION INTRAMUSCULAR; INTRAVENOUS AS NEEDED
Status: DISCONTINUED | OUTPATIENT
Start: 2023-11-08 | End: 2023-11-08

## 2023-11-08 RX ADMIN — PROPOFOL 20 MG: 10 INJECTION, EMULSION INTRAVENOUS at 10:35

## 2023-11-08 RX ADMIN — FENTANYL CITRATE 50 MCG: 0.05 INJECTION, SOLUTION INTRAMUSCULAR; INTRAVENOUS at 10:26

## 2023-11-08 RX ADMIN — SODIUM CHLORIDE, SODIUM LACTATE, POTASSIUM CHLORIDE, AND CALCIUM CHLORIDE: 600; 310; 30; 20 INJECTION, SOLUTION INTRAVENOUS at 10:28

## 2023-11-08 RX ADMIN — MIDAZOLAM HYDROCHLORIDE 2 MG: 1 INJECTION, SOLUTION INTRAMUSCULAR; INTRAVENOUS at 10:26

## 2023-11-08 RX ADMIN — PROPOFOL 20 MG: 10 INJECTION, EMULSION INTRAVENOUS at 10:45

## 2023-11-08 RX ADMIN — METOCLOPRAMIDE 10 MG: 10 TABLET ORAL at 10:00

## 2023-11-08 RX ADMIN — FENTANYL CITRATE 50 MCG: 0.05 INJECTION, SOLUTION INTRAMUSCULAR; INTRAVENOUS at 10:30

## 2023-11-08 RX ADMIN — MIDAZOLAM HYDROCHLORIDE 2 MG: 1 INJECTION, SOLUTION INTRAMUSCULAR; INTRAVENOUS at 10:32

## 2023-11-08 RX ADMIN — CLINDAMYCIN PHOSPHATE 600 MG: 600 INJECTION, SOLUTION INTRAVENOUS at 10:33

## 2023-11-08 RX ADMIN — FAMOTIDINE 20 MG: 20 TABLET ORAL at 10:00

## 2023-11-08 SDOH — HEALTH STABILITY: MENTAL HEALTH: CURRENT SMOKER: 0

## 2023-11-08 ASSESSMENT — COLUMBIA-SUICIDE SEVERITY RATING SCALE - C-SSRS
2. HAVE YOU ACTUALLY HAD ANY THOUGHTS OF KILLING YOURSELF?: NO
6. HAVE YOU EVER DONE ANYTHING, STARTED TO DO ANYTHING, OR PREPARED TO DO ANYTHING TO END YOUR LIFE?: NO
1. IN THE PAST MONTH, HAVE YOU WISHED YOU WERE DEAD OR WISHED YOU COULD GO TO SLEEP AND NOT WAKE UP?: NO

## 2023-11-08 ASSESSMENT — ENCOUNTER SYMPTOMS
EYES NEGATIVE: 1
CONSTITUTIONAL NEGATIVE: 1
RESPIRATORY NEGATIVE: 1
GASTROINTESTINAL NEGATIVE: 1
PSYCHIATRIC NEGATIVE: 1
NEUROLOGICAL NEGATIVE: 1

## 2023-11-08 ASSESSMENT — PAIN SCALES - GENERAL
PAINLEVEL_OUTOF10: 0 - NO PAIN
PAIN_LEVEL: 0

## 2023-11-08 ASSESSMENT — PAIN - FUNCTIONAL ASSESSMENT
PAIN_FUNCTIONAL_ASSESSMENT: 0-10

## 2023-11-08 NOTE — OP NOTE
PODIATRIC OPERATIVE REPORT    LOG ID: [id number]  SURGERY/PROCEDURE DATE: 11/8/2023    SURGEON(S)/PROCEDURALIST(S) AND ASSISTANT(S):  Primary: Albert Savage DPM    SURGERY/PROCEDURE(S):  Removal of External fixator, right (84469)  Debridement of pin sits to bone, right (69031)  Application of multi-layer compression dressing, right (55019)    ANESTHESIA: Anesthesia type not filed in the log.    PRE-OP/PRE-PROCEDURE DIAGNOSIS:   Pre-op Diagnosis     * Charcot's joint of ankle, right [M14.671]     * Localized edema [R60.0]     * Acquired external rotation of foot, right [M21.6X1]    POST-OP/POST-PROCEDURE DIAGNOSIS: Same as Pre-Op    FINDINGS: Intraoperative findings consistent with clinical and radiographic findings.    ESTIMATED BLOOD LOSS: Minimal    SPECIMENS: No specimens collected during this procedure.    IMPLANTABLE DEVICES:  Nothing was implanted during the procedure      COMPLICATIONS: None; patient tolerated the procedure well.       SURGERY/PROCEDURE DETAILS:  INDICATIONS FOR PROCEDURE:   The patient with diagnosis as outlined above presents for podiatric surgical intervention today. The patient has attempted and failed conservative treatment as outlined in preoperative clinic notes and wishes to proceed with surgical intervention at this time. The nature of the deformity, problems anticipated procedures, recovery/convalescence, risks/complications including but not limited to numbness, CRPS, over/under correction, problems healing of soft tissue or bone, postoperative wound infection, wound dehiscence, DVT and/or persistent pain/disability have been explained to the patient in detail. An updated H&P and consent have been completed prior to today’s surgical intervention. The patient states that they have been NPO since midnight. No guarantees were given or implied, but it is expected that the patient will have a favorable outcome.  It is with this understanding that we proceed.     PRE-PROCEDURE  INFORMATION:  In pre-op holding area, the right extremity to be operated on was clearly marked and the patient verified correct laterality of the marking.  The patient remained on the cart for the procedure.  A timeout was performed in which identification of the correct patient, procedure, location, and materials was done. No tourniquet was used. Following MAC sedation the procedure began.    DESCRIPTION OF PROCEDURE:   Procedure 1:  Attention was directed to the external fixator located on the right lower extremity. Using a pin cutter all wires were cut and removed form the foot. Next all half pin fixation bolts were loosened and the half pins were removed. The foot plate was then release and removed allowing for the frame to be taken off of the extremity. Upon removal the foot remained in a rectus and in a brace-able position. The entire leg was then scrubbed with chlorohexadine and rinsed.    Procedure 2:  Using a curette all pins sites were debrided down to and including the level of bone. All non-viable bone and tissue was removed from the pin sites. There was a total of 7 pin sites measuring 0.3 x 0.2 x 0.5 cm. Staples were then placed to close the sites.    Procedure 3:  Next a dressing consisting of bacitracin, 4x4, kerlix, and alternating rolls of specialist cast padding and double 6 inch ace bandage was applied.       POSTOPERATIVE INFORMATION: The patient tolerated the above noted procedure and anesthesia well and was transferred to the PACU with vital signs stable, and vascular status intact with capillary refill intact to the most distal aspect of the operative extremity. Postoperative instructions reviewed in detail with the patient with written instructions provided. Patient will return to clinic in approximately 3-5 days for first postoperative visit. Patient has the number of the clinic and was instructed to call prior to that time should any problems, questions, or concerns arise.    This is  Albert Savage DPM dictating the operative note      SIGNATURE: Albert Savage DPM PATIENT NAME: Lazarus Truong   DATE: November 8, 2023 MRN: [id number]   TIME: 10:56 AM

## 2023-11-08 NOTE — POST-PROCEDURE NOTE
Discharge instructions explained to patient and copy provided. Patient denies any questions. Patient tolerating gingerale and cookies, denies any nausea. Patient denies any pain. IV removed. Patient on phone arranging transport, denies any needs.

## 2023-11-08 NOTE — H&P
History Of Present Illness  Lazarus Truong is a 53 y.o. male presenting to [facility] this morning for elective procedure consisting of removal of external fixator, debridement of pins sites and application of multi-layer compressive dressing of the right lower extremity. Patient states that he has had the fixator on for several weeks at this point due to prior deformity of the right lower extremity. Patient denies any trauma to the right lower extremity. Denies any constitutional symptoms. Denies any other pedal complaints at this time.      Past Medical History  Past Medical History:   Diagnosis Date    Encounter for other preprocedural examination 07/02/2017    Pre-operative clearance    ST elevation (STEMI) myocardial infarction involving other coronary artery of anterior wall (CMS/HCC)     Anterior myocardial infarction    Unspecified open wound, unspecified foot, initial encounter 06/20/2017    Open wound of foot       Surgical History  No past surgical history on file.     Social History  He has no history on file for tobacco use, alcohol use, and drug use.    Family History  No family history on file.     Allergies  Penicillins and Shellfish containing products    Review of Systems   Constitutional: Negative.    HENT: Negative.     Eyes: Negative.    Respiratory: Negative.     Cardiovascular:  Positive for leg swelling.   Gastrointestinal: Negative.    Genitourinary: Negative.    Neurological: Negative.    Psychiatric/Behavioral: Negative.          Physical Exam  Constitutional:       Appearance: Normal appearance.   Cardiovascular:      Rate and Rhythm: Normal rate.   Pulmonary:      Effort: Pulmonary effort is normal.      Breath sounds: Normal breath sounds.   Abdominal:      General: Bowel sounds are normal.   Musculoskeletal:         General: Swelling and deformity present.      Cervical back: Normal range of motion.      Right lower leg: Swelling and deformity present. Edema present.      Right  foot: Decreased range of motion. Deformity and Charcot foot present.   Feet:      Right foot:      Skin integrity: Warmth present.   Skin:     Findings: No bruising or erythema.   Neurological:      Mental Status: He is alert.          Last Recorded Vitals  Blood pressure 108/62, pulse 82, temperature 36.1 °C (97 °F), temperature source Temporal, resp. rate 16, SpO2 99 %.    Relevant Results           Assessment/Plan   Active Problems:    Atrial fibrillation (CMS/HCC)    CAD (coronary artery disease)    Asthma    Obesity    Diabetes mellitus type 1 (CMS/HCC)      Plan for patient to undergo removal of multiplanar external fixator, debridement of pins sites down to bone and application of multi-layer compressive dressing.           Mike Carmona DPM  Podiatric Medicine/Surgery Resident

## 2023-11-08 NOTE — ANESTHESIA POSTPROCEDURE EVALUATION
Patient: Lazarus Truong    Procedure Summary       Date: 11/08/23 Room / Location: [facility] / [facility]    Anesthesia Start: 1028 Anesthesia Stop: 1058    Procedures:       Removal of External Fixatior, right leg (Right: Leg Lower)      Debridement of Nonviable Soft Tissue and Bone, right leg (Right: Leg Lower)      Application of Multilayer Compression Dressing, right leg (Right: Leg Lower) Diagnosis:       Charcot's joint of ankle, right      Localized edema      Acquired external rotation of foot, right      (Charcots joint, right. Localized edema. Other acquired deformities, right.)    Surgeons: Albert Savage DPM Responsible Provider: David Justin MD    Anesthesia Type: MAC ASA Status: 3            Anesthesia Type: MAC    Vitals Value Taken Time   /68 11/08/23 1058   Temp 36 11/08/23 1058   Pulse 77 11/08/23 1058   Resp 18 11/08/23 1058   SpO2 95 11/08/23 1058       Anesthesia Post Evaluation    Patient location during evaluation: PACU  Patient participation: complete - patient participated  Level of consciousness: awake  Pain score: 0  Pain management: adequate  Multimodal analgesia pain management approach  Airway patency: patent  Two or more strategies used to mitigate risk of obstructive sleep apnea  Cardiovascular status: acceptable  Respiratory status: acceptable  Hydration status: acceptable  Comments: No Nausea        There were no known notable events for this encounter.

## 2023-11-08 NOTE — ANESTHESIA PREPROCEDURE EVALUATION
Patient: Lazarus Truong    Procedure Information       Date/Time: 11/08/23 5325    Procedures:       Removal of External Fixatior, right leg (Right: Leg Lower) - - AMDT: SIXFIX, REMOVAL KIT  *AOA*      Debridement of Nonviable Soft Tissue and Bone, right leg (Right: Leg Lower)      Application of Multilayer Compression Dressing, right leg (Right: Leg Lower)    Location: [facility] / [facility]    Surgeons: Albert Savage DPM            Relevant Problems   Anesthesia (within normal limits)      Cardiovascular  EF 30-34   (+) Atrial fibrillation (CMS/HCC)   (+) CAD (coronary artery disease)      Endocrine   (+) Diabetes mellitus type 1 (CMS/HCC)   (+) Obesity      GI (within normal limits)      /Renal (within normal limits)      Neuro/Psych (within normal limits)      Pulmonary   (+) Asthma      GI/Hepatic (within normal limits)      Hematology (within normal limits)      Musculoskeletal (within normal limits)      Eyes, Ears, Nose, and Throat (within normal limits)      Infectious Disease (within normal limits)     Past Medical History:   Diagnosis Date    Encounter for other preprocedural examination 07/02/2017    Pre-operative clearance    ST elevation (STEMI) myocardial infarction involving other coronary artery of anterior wall (CMS/HCC)     Anterior myocardial infarction    Unspecified open wound, unspecified foot, initial encounter 06/20/2017    Open wound of foot       Clinical information reviewed:    Allergies  Meds             Allergies   Allergen Reactions    Penicillins Unknown    Shellfish Containing Products Unknown     Prior to Admission medications    Medication Sig Start Date End Date Taking? Authorizing Provider   apixaban (Eliquis) 5 mg tablet Take 1 tablet (5 mg) by mouth 2 times a day.   Yes Historical Provider, MD   docusate sodium (Colace) 100 mg capsule Take 1 capsule (100 mg) by mouth 2 times a day.   Yes Historical Provider, MD   lisinopril 5 mg tablet Take 1 tablet (5 mg) by mouth  once daily.   Yes Historical Provider, MD   metFORMIN, OSM, (Fortamet) 500 mg 24 hr tablet Take 1 tablet (500 mg) by mouth 2 times a day with meals. Do not crush, chew, or split.   Yes Historical Provider, MD   calcium carbonate (Tums) 200 mg calcium chewable tablet Chew 1 tablet (500 mg) once daily.    Historical Provider, MD   guaiFENesin (Robitussin) 100 mg/5 mL syrup Take 10 mL (200 mg) by mouth 3 times a day as needed for cough.    Historical Provider, MD   oxyCODONE-acetaminophen (Percocet) 5-325 mg tablet Take 1 tablet by mouth every 4 hours if needed for severe pain (7 - 10).    Historical Provider, MD     No past surgical history on file.    NPO Detail:  NPO/Void Status  Date of Last Liquid: 11/07/23  Time of Last Liquid: 2330  Date of Last Solid: 11/07/23  Time of Last Solid: 2330         Physical Exam    Airway  Mallampati: III  TM distance: >3 FB  Neck ROM: limited     Cardiovascular - normal exam     Dental - normal exam     Pulmonary - normal exam     Abdominal   (+) obese             Anesthesia Plan    ASA 3     MAC     The patient is not a current smoker.  Education provided regarding risk of obstructive sleep apnea.  intravenous induction   Anesthetic plan and risks discussed with patient.    Plan discussed with CRNA.

## 2023-11-08 NOTE — POST-PROCEDURE NOTE
Patient brought back from PACU. Patient awake and alert, denies any pain. Patient denies any nausea. Bandage wrap to right foot/leg dry and intact. Patient's Mother brought back to the bedside. Cookies and gingerale provided.

## 2024-01-10 ENCOUNTER — HOSPITAL ENCOUNTER (OUTPATIENT)
Facility: HOSPITAL | Age: 54
Setting detail: OUTPATIENT SURGERY
Discharge: HOME | End: 2024-01-10
Attending: PODIATRIST | Admitting: PODIATRIST
Payer: MEDICAID

## 2024-01-10 ENCOUNTER — ANESTHESIA (OUTPATIENT)
Dept: OPERATING ROOM | Facility: HOSPITAL | Age: 54
End: 2024-01-10
Payer: MEDICAID

## 2024-01-10 ENCOUNTER — ANESTHESIA EVENT (OUTPATIENT)
Dept: OPERATING ROOM | Facility: HOSPITAL | Age: 54
End: 2024-01-10
Payer: MEDICAID

## 2024-01-10 ENCOUNTER — APPOINTMENT (OUTPATIENT)
Dept: CARDIOLOGY | Facility: HOSPITAL | Age: 54
End: 2024-01-10
Payer: MEDICAID

## 2024-01-10 ENCOUNTER — APPOINTMENT (OUTPATIENT)
Dept: RADIOLOGY | Facility: HOSPITAL | Age: 54
End: 2024-01-10
Payer: MEDICAID

## 2024-01-10 VITALS
HEART RATE: 71 BPM | WEIGHT: 315 LBS | BODY MASS INDEX: 46.65 KG/M2 | OXYGEN SATURATION: 96 % | HEIGHT: 69 IN | SYSTOLIC BLOOD PRESSURE: 130 MMHG | DIASTOLIC BLOOD PRESSURE: 84 MMHG | TEMPERATURE: 96.8 F | RESPIRATION RATE: 16 BRPM

## 2024-01-10 DIAGNOSIS — L98.496: ICD-10-CM

## 2024-01-10 DIAGNOSIS — R60.0 LOCALIZED EDEMA: ICD-10-CM

## 2024-01-10 LAB
ANION GAP SERPL CALC-SCNC: 9 MMOL/L
ATRIAL RATE: 74 BPM
BUN SERPL-MCNC: 17 MG/DL (ref 8–25)
CALCIUM SERPL-MCNC: 8.9 MG/DL (ref 8.5–10.4)
CHLORIDE SERPL-SCNC: 100 MMOL/L (ref 97–107)
CO2 SERPL-SCNC: 24 MMOL/L (ref 24–31)
CREAT SERPL-MCNC: 0.9 MG/DL (ref 0.4–1.6)
EGFRCR SERPLBLD CKD-EPI 2021: >90 ML/MIN/1.73M*2
GLUCOSE BLD MANUAL STRIP-MCNC: 126 MG/DL (ref 74–99)
GLUCOSE SERPL-MCNC: 129 MG/DL (ref 65–99)
P AXIS: 28 DEGREES
P OFFSET: 202 MS
P ONSET: 147 MS
POTASSIUM SERPL-SCNC: 4.7 MMOL/L (ref 3.4–5.1)
PR INTERVAL: 144 MS
Q ONSET: 219 MS
QRS COUNT: 12 BEATS
QRS DURATION: 166 MS
QT INTERVAL: 406 MS
QTC CALCULATION(BAZETT): 450 MS
QTC FREDERICIA: 435 MS
R AXIS: -31 DEGREES
SODIUM SERPL-SCNC: 133 MMOL/L (ref 133–145)
T AXIS: 10 DEGREES
T OFFSET: 422 MS
VENTRICULAR RATE: 74 BPM

## 2024-01-10 PROCEDURE — A28005 PR DEEP INCIS FOOT BONE INFECTN: Performed by: ANESTHESIOLOGY

## 2024-01-10 PROCEDURE — 2500000001 HC RX 250 WO HCPCS SELF ADMINISTERED DRUGS (ALT 637 FOR MEDICARE OP): Performed by: ANESTHESIOLOGY

## 2024-01-10 PROCEDURE — 93005 ELECTROCARDIOGRAM TRACING: CPT | Mod: 59

## 2024-01-10 PROCEDURE — 2500000004 HC RX 250 GENERAL PHARMACY W/ HCPCS (ALT 636 FOR OP/ED)

## 2024-01-10 PROCEDURE — 93010 ELECTROCARDIOGRAM REPORT: CPT | Performed by: INTERNAL MEDICINE

## 2024-01-10 PROCEDURE — 3700000002 HC GENERAL ANESTHESIA TIME - EACH INCREMENTAL 1 MINUTE: Performed by: PODIATRIST

## 2024-01-10 PROCEDURE — 3700000001 HC GENERAL ANESTHESIA TIME - INITIAL BASE CHARGE: Performed by: PODIATRIST

## 2024-01-10 PROCEDURE — 73610 X-RAY EXAM OF ANKLE: CPT | Mod: RT

## 2024-01-10 PROCEDURE — 7100000001 HC RECOVERY ROOM TIME - INITIAL BASE CHARGE: Performed by: PODIATRIST

## 2024-01-10 PROCEDURE — 80048 BASIC METABOLIC PNL TOTAL CA: CPT | Performed by: PODIATRIST

## 2024-01-10 PROCEDURE — 2500000005 HC RX 250 GENERAL PHARMACY W/O HCPCS: Performed by: PODIATRIST

## 2024-01-10 PROCEDURE — 82947 ASSAY GLUCOSE BLOOD QUANT: CPT | Mod: 59

## 2024-01-10 PROCEDURE — A28005 PR DEEP INCIS FOOT BONE INFECTN: Performed by: NURSE ANESTHETIST, CERTIFIED REGISTERED

## 2024-01-10 PROCEDURE — 7100000010 HC PHASE TWO TIME - EACH INCREMENTAL 1 MINUTE: Performed by: PODIATRIST

## 2024-01-10 PROCEDURE — 2500000004 HC RX 250 GENERAL PHARMACY W/ HCPCS (ALT 636 FOR OP/ED): Performed by: NURSE ANESTHETIST, CERTIFIED REGISTERED

## 2024-01-10 PROCEDURE — 87186 SC STD MICRODIL/AGAR DIL: CPT | Mod: TRILAB,WESLAB | Performed by: PODIATRIST

## 2024-01-10 PROCEDURE — 7100000002 HC RECOVERY ROOM TIME - EACH INCREMENTAL 1 MINUTE: Performed by: PODIATRIST

## 2024-01-10 PROCEDURE — 2720000007 HC OR 272 NO HCPCS: Performed by: PODIATRIST

## 2024-01-10 PROCEDURE — 7100000009 HC PHASE TWO TIME - INITIAL BASE CHARGE: Performed by: PODIATRIST

## 2024-01-10 PROCEDURE — 3600000003 HC OR TIME - INITIAL BASE CHARGE - PROCEDURE LEVEL THREE: Performed by: PODIATRIST

## 2024-01-10 PROCEDURE — 3600000008 HC OR TIME - EACH INCREMENTAL 1 MINUTE - PROCEDURE LEVEL THREE: Performed by: PODIATRIST

## 2024-01-10 PROCEDURE — 36415 COLL VENOUS BLD VENIPUNCTURE: CPT | Performed by: PODIATRIST

## 2024-01-10 RX ORDER — CEFAZOLIN SODIUM IN 0.9 % NACL 3 G/100 ML
3 INTRAVENOUS SOLUTION, PIGGYBACK (ML) INTRAVENOUS ONCE
Status: COMPLETED | OUTPATIENT
Start: 2024-01-10 | End: 2024-01-10

## 2024-01-10 RX ORDER — DIPHENHYDRAMINE HYDROCHLORIDE 50 MG/ML
12.5 INJECTION INTRAMUSCULAR; INTRAVENOUS ONCE AS NEEDED
Status: DISCONTINUED | OUTPATIENT
Start: 2024-01-10 | End: 2024-01-10 | Stop reason: HOSPADM

## 2024-01-10 RX ORDER — SODIUM CHLORIDE, SODIUM LACTATE, POTASSIUM CHLORIDE, CALCIUM CHLORIDE 600; 310; 30; 20 MG/100ML; MG/100ML; MG/100ML; MG/100ML
50 INJECTION, SOLUTION INTRAVENOUS CONTINUOUS
Status: DISCONTINUED | OUTPATIENT
Start: 2024-01-10 | End: 2024-01-10 | Stop reason: HOSPADM

## 2024-01-10 RX ORDER — FAMOTIDINE 20 MG/1
20 TABLET, FILM COATED ORAL ONCE
Status: COMPLETED | OUTPATIENT
Start: 2024-01-10 | End: 2024-01-10

## 2024-01-10 RX ORDER — ALBUTEROL SULFATE 0.83 MG/ML
2.5 SOLUTION RESPIRATORY (INHALATION) ONCE
Status: DISCONTINUED | OUTPATIENT
Start: 2024-01-10 | End: 2024-01-10 | Stop reason: HOSPADM

## 2024-01-10 RX ORDER — ALBUTEROL SULFATE 0.83 MG/ML
2.5 SOLUTION RESPIRATORY (INHALATION) ONCE AS NEEDED
Status: DISCONTINUED | OUTPATIENT
Start: 2024-01-10 | End: 2024-01-10 | Stop reason: HOSPADM

## 2024-01-10 RX ORDER — HYDRALAZINE HYDROCHLORIDE 20 MG/ML
10 INJECTION INTRAMUSCULAR; INTRAVENOUS EVERY 30 MIN PRN
Status: DISCONTINUED | OUTPATIENT
Start: 2024-01-10 | End: 2024-01-10 | Stop reason: HOSPADM

## 2024-01-10 RX ORDER — KETOROLAC TROMETHAMINE 30 MG/ML
15 INJECTION, SOLUTION INTRAMUSCULAR; INTRAVENOUS ONCE AS NEEDED
Status: DISCONTINUED | OUTPATIENT
Start: 2024-01-10 | End: 2024-01-10 | Stop reason: HOSPADM

## 2024-01-10 RX ORDER — PROPOFOL 10 MG/ML
INJECTION, EMULSION INTRAVENOUS AS NEEDED
Status: DISCONTINUED | OUTPATIENT
Start: 2024-01-10 | End: 2024-01-10

## 2024-01-10 RX ORDER — KETAMINE HYDROCHLORIDE 10 MG/ML
INJECTION, SOLUTION INTRAMUSCULAR; INTRAVENOUS AS NEEDED
Status: DISCONTINUED | OUTPATIENT
Start: 2024-01-10 | End: 2024-01-10

## 2024-01-10 RX ORDER — MIDAZOLAM HYDROCHLORIDE 1 MG/ML
INJECTION, SOLUTION INTRAMUSCULAR; INTRAVENOUS AS NEEDED
Status: DISCONTINUED | OUTPATIENT
Start: 2024-01-10 | End: 2024-01-10

## 2024-01-10 RX ORDER — LABETALOL HYDROCHLORIDE 5 MG/ML
10 INJECTION, SOLUTION INTRAVENOUS ONCE AS NEEDED
Status: DISCONTINUED | OUTPATIENT
Start: 2024-01-10 | End: 2024-01-10 | Stop reason: HOSPADM

## 2024-01-10 RX ORDER — ONDANSETRON HYDROCHLORIDE 2 MG/ML
4 INJECTION, SOLUTION INTRAVENOUS ONCE AS NEEDED
Status: DISCONTINUED | OUTPATIENT
Start: 2024-01-10 | End: 2024-01-10 | Stop reason: HOSPADM

## 2024-01-10 RX ORDER — BUPIVACAINE HYDROCHLORIDE 5 MG/ML
INJECTION, SOLUTION PERINEURAL AS NEEDED
Status: DISCONTINUED | OUTPATIENT
Start: 2024-01-10 | End: 2024-01-10 | Stop reason: HOSPADM

## 2024-01-10 RX ORDER — OXYCODONE HCL 10 MG/1
10 TABLET, FILM COATED, EXTENDED RELEASE ORAL ONCE AS NEEDED
Status: DISCONTINUED | OUTPATIENT
Start: 2024-01-10 | End: 2024-01-10 | Stop reason: HOSPADM

## 2024-01-10 RX ORDER — METOCLOPRAMIDE 10 MG/1
10 TABLET ORAL ONCE
Status: COMPLETED | OUTPATIENT
Start: 2024-01-10 | End: 2024-01-10

## 2024-01-10 RX ORDER — MEPERIDINE HYDROCHLORIDE 25 MG/ML
12.5 INJECTION INTRAMUSCULAR; INTRAVENOUS; SUBCUTANEOUS EVERY 10 MIN PRN
Status: DISCONTINUED | OUTPATIENT
Start: 2024-01-10 | End: 2024-01-10 | Stop reason: HOSPADM

## 2024-01-10 RX ADMIN — PROPOFOL 30 MG: 10 INJECTION, EMULSION INTRAVENOUS at 13:15

## 2024-01-10 RX ADMIN — FAMOTIDINE 20 MG: 20 TABLET, FILM COATED ORAL at 12:02

## 2024-01-10 RX ADMIN — MIDAZOLAM HYDROCHLORIDE 2 MG: 1 INJECTION, SOLUTION INTRAMUSCULAR; INTRAVENOUS at 13:06

## 2024-01-10 RX ADMIN — METOCLOPRAMIDE 10 MG: 10 TABLET ORAL at 12:02

## 2024-01-10 RX ADMIN — MIDAZOLAM HYDROCHLORIDE 1 MG: 1 INJECTION, SOLUTION INTRAMUSCULAR; INTRAVENOUS at 13:12

## 2024-01-10 RX ADMIN — SODIUM CHLORIDE, POTASSIUM CHLORIDE, SODIUM LACTATE AND CALCIUM CHLORIDE: 600; 310; 30; 20 INJECTION, SOLUTION INTRAVENOUS at 12:58

## 2024-01-10 RX ADMIN — Medication 3 G: at 13:06

## 2024-01-10 RX ADMIN — KETAMINE HYDROCHLORIDE 50 MG: 10 INJECTION, SOLUTION INTRAMUSCULAR; INTRAVENOUS at 13:14

## 2024-01-10 ASSESSMENT — PAIN - FUNCTIONAL ASSESSMENT
PAIN_FUNCTIONAL_ASSESSMENT: 0-10

## 2024-01-10 ASSESSMENT — COLUMBIA-SUICIDE SEVERITY RATING SCALE - C-SSRS
6. HAVE YOU EVER DONE ANYTHING, STARTED TO DO ANYTHING, OR PREPARED TO DO ANYTHING TO END YOUR LIFE?: NO
2. HAVE YOU ACTUALLY HAD ANY THOUGHTS OF KILLING YOURSELF?: NO
1. IN THE PAST MONTH, HAVE YOU WISHED YOU WERE DEAD OR WISHED YOU COULD GO TO SLEEP AND NOT WAKE UP?: NO

## 2024-01-10 ASSESSMENT — PAIN SCALES - GENERAL
PAINLEVEL_OUTOF10: 2
PAINLEVEL_OUTOF10: 0 - NO PAIN
PAIN_LEVEL: 2
PAINLEVEL_OUTOF10: 2
PAINLEVEL_OUTOF10: 0 - NO PAIN

## 2024-01-10 ASSESSMENT — PAIN DESCRIPTION - DESCRIPTORS
DESCRIPTORS: ACHING
DESCRIPTORS: ACHING

## 2024-01-10 NOTE — ANESTHESIA POSTPROCEDURE EVALUATION
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 Localized edema.)    Surgeons: Albert Savage DPM Responsible Provider: Dillon Wheatley DO    Anesthesia Type: general ASA Status: 3            Anesthesia Type: general    Vitals Value Taken Time   /91 01/10/24 1440   Temp 36.4 °C (97.5 °F) 01/10/24 1353   Pulse 75 01/10/24 1443   Resp 16 01/10/24 1443   SpO2 96 % 01/10/24 1443   Vitals shown include unvalidated device data.    Anesthesia Post Evaluation    Patient location during evaluation: bedside  Patient participation: complete - patient participated  Level of consciousness: awake  Pain score: 2  Pain management: adequate  Airway patency: patent  Two or more strategies used to mitigate risk of obstructive sleep apnea  Cardiovascular status: acceptable  Respiratory status: acceptable  Hydration status: acceptable  Postoperative Nausea and Vomiting: none        No notable events documented.

## 2024-01-10 NOTE — ANESTHESIA PREPROCEDURE EVALUATION
Patient: Lazarus Truong    Procedure Information       Date/Time: 01/10/24 1645    Procedures:       Debridement Lower Extremity (Right: Foot)      Excision Lesion Lower Extremity (Right: Foot) - C-ARM, REARFOOT AND ANKLE TRAY    Location: [facility] / [facility]    Surgeons: Albert Savage DPM            Relevant Problems   Cardiovascular   (+) Atrial fibrillation (CMS/HCC)   (+) CAD (coronary artery disease)      Endocrine   (+) Diabetes mellitus type 1 (CMS/HCC)   (+) Obesity      Pulmonary   (+) Asthma       Clinical information reviewed:   Tobacco  Allergies  Meds   Med Hx  Surg Hx   Fam Hx  Soc Hx      Past Medical History:   Diagnosis Date    Anxiety     Asthma     Diabetes mellitus (CMS/HCC)     Encounter for other preprocedural examination 07/02/2017    Pre-operative clearance    Irregular heart beat     ST elevation (STEMI) myocardial infarction involving other coronary artery of anterior wall (CMS/HCC)     Anterior myocardial infarction    Unspecified open wound, unspecified foot, initial encounter 06/20/2017    Open wound of foot     Past Surgical History:   Procedure Laterality Date    FOOT SURGERY         NPO Detail:  NPO/Void Status  Carbohydrate Drink Given Prior to Surgery? : N  Date of Last Liquid: 01/09/24  Time of Last Liquid: 0000  Date of Last Solid: 01/09/24  Time of Last Solid: 1800  Last Intake Type: Clear fluids; Solid meal  Time of Last Void: 1000         Physical Exam    Airway  Mallampati: II  TM distance: >3 FB  Neck ROM: full     Cardiovascular   Rhythm: regular  Rate: normal     Dental - normal exam     Pulmonary   Breath sounds clear to auscultation     Abdominal   Abdomen: soft             Anesthesia Plan    History of general anesthesia?: yes  History of complications of general anesthesia?: no    ASA 3     general     intravenous induction   Postoperative administration of opioids is intended.  Anesthetic plan and risks discussed with patient.    Plan discussed with  CRNA, attending and CAA.

## 2024-01-10 NOTE — PERIOPERATIVE NURSING NOTE
1454- arrived to [facility] 22 via cart with RN. Alert, denies c/o pain and/or nausea. IV infusing. Right foot dressing dry and intact, skin warm and pink, brisk cap refill. Pt able to dangle at bedside, voided via urinal. Call light within reach. Po fluids and crackers given.       1515- pt wants to verify he has a home going prescription for antibiotic. Message sent to MD and pt has prescription at skilled rehab. Discussed with pt.     1530- Pt with stable VSS. Tolerating PO fluids and crackers. Reviewed discharge instructions with pt, questions answered and verbalized understanding. Call to skilled rehab for ride back to facility.     1545- pt denies assistance with dressing for discharge

## 2024-01-10 NOTE — PERIOPERATIVE NURSING NOTE
"1454- arrived to [facility] via cart with RN, VSS, alert and denies pain and/or nausea. Right foot with dressing intact, toes pink and warm, brisk cap refill. Offered PO fluids and crackers. IV hep locked. Pt states he \"thinks I am supposed to be placed on an antibiotic after surgery.\" RN messaged Dr Savage.   "

## 2024-01-10 NOTE — DISCHARGE INSTRUCTIONS
PODIATRIC SURGERY POST-OP INSTRUCTIONS    POST-OP INSTRUCTIONS:  Keep dressing clean, dry and intact until your first post-operative appointment.  Do not remove surgical dressing. You may need to loosen if necessary.   Do not shower unless using a cast protector to protect dressing.  If dressing gets wet, please call office immediately as this can lead to increased risk of infection or wound dehiscence.   Elevate surgical extremity as much as possible to help with pain and swelling.  Place ice pack behind knee of surgical extremity (20 minutes on/20 minutes off while awake). After 24 hours use ice behind the knee as needed for comfort.  Weight Bearing Status: Please remain non-weight-bearing to right foot and ankle. If needed, may to touch in CROW boot. Stay off the foot as much as possible.  Please resume all home medications.   Post-Operative Medications: You may take Ibuprofen for pain; please take as directed on bottle. You may take Tylenol for pain; please take as directed on bottle. Alternate Ibuprofen and Tylenol for pain; please take as directed on the bottles. For your pain medication, take as needed; wean off as soon as tolerated. In the event you have constipation, you may take over the counter stool softeners and laxatives as needed.  Post-operative appointment with Dr. Savgae for 1 week from today or less. Please call to schedule if not already scheduled.  If you have any problems or notice any unusual symptoms such as bleeding, excessive pain, fever, redness, swelling and/or discharge please call your Dr. Savage's office at [phone].     WHEN TO CALL YOUR DOCTOR:  Fever (>100.4°F or 38.0°C) or chills.  Incision problems such as redness, warmth, swelling, or foul smelling drainage.  Severe nausea or persistent vomiting.  Pain and swelling in your legs, especially if it is only on one side and not the other.  Pain with urination, cloudy urine, or foul smelling urine.  Or if you have any other  problems or questions.  CALL 911 or go to the ED if you have any shortness of breath, difficulty breathing, or chest pain.

## 2024-01-10 NOTE — H&P
Surgical History & Physical    Patient Name and MRN: LAZARUS TRUONG, [id number]  PCP: Edmundo Grullon MD     HPI:  Lazarus Truong 53 y.o. male presents for Right lower extremity excisional debridement.     Past Medical History:   Diagnosis Date    Anxiety     Asthma     Diabetes mellitus (CMS/HCC)     Encounter for other preprocedural examination 07/02/2017    Pre-operative clearance    Irregular heart beat     ST elevation (STEMI) myocardial infarction involving other coronary artery of anterior wall (CMS/[facility])     Anterior myocardial infarction    Unspecified open wound, unspecified foot, initial encounter 06/20/2017    Open wound of foot     Past Surgical History:   Procedure Laterality Date    FOOT SURGERY       Family History   Problem Relation Name Age of Onset    Diabetes Mother      Glaucoma Father      Hypertension Father      Diabetes Father       Social History     Tobacco Use    Smoking status: Never    Smokeless tobacco: Never   Vaping Use    Vaping Use: Never used   Substance Use Topics    Alcohol use: Yes     Alcohol/week: 2.0 standard drinks of alcohol     Types: 2 Glasses of wine per week     Comment: occassionally    Drug use: Never     Prior to Admission Medications   Prescriptions Last Dose Informant Patient Reported? Taking?   apixaban (Eliquis) 5 mg tablet 1/9/2024 at 0800  Yes Yes   Sig: Take 1 tablet (5 mg) by mouth 2 times a day.   docusate sodium (Colace) 100 mg capsule More than a month  Yes No   Sig: Take 1 capsule (100 mg) by mouth 2 times a day as needed.   lisinopril 5 mg tablet Past Week  Yes Yes   Sig: Take 1 tablet (5 mg) by mouth once daily.   metFORMIN, OSM, (Fortamet) 500 mg 24 hr tablet 1/9/2024 at 0800  Yes Yes   Sig: Take 1 tablet (500 mg) by mouth 2 times a day with meals. Do not crush, chew, or split.      Facility-Administered Medications: None         REVIEW OF SYSTEMS:  Constitutional:  Negative for fatigue and fever.   Respiratory:  Negative for cough, chest tightness and  "shortness of breath.    Cardiovascular:  Negative for chest pain, palpitations and leg swelling.   Gastrointestinal:  Negative for abdominal pain, diarrhea and nausea.   Skin: Negative.    Psychiatric/Behavioral: Negative.         PHYSICAL EXAM:  Patient Vitals for the past 24 hrs:   BP Temp Temp src Pulse Resp SpO2 Height Weight   01/10/24 1139 131/66 36.3 °C (97.3 °F) Temporal 86 16 99 % 1.753 m (5' 9\") (!) 165 kg (363 lb)       Constitutional:       Appearance: Normal appearance.   Cardiovascular:      Rate and Rhythm: Normal rate.   Pulmonary:      Effort: Pulmonary effort is normal.      Breath sounds: Normal breath sounds.   Abdominal:      General: Bowel sounds are normal.   Musculoskeletal:         General: Swelling and deformity present.      Cervical back: Normal range of motion.      Right lower leg: Swelling and deformity present. Edema present.      Right foot: Decreased range of motion. Deformity and Charcot foot present.   Feet:      Right foot:      Skin integrity: Warmth present.   Skin:     Findings: No bruising or erythema.   Neurological:      Mental Status: He is alert.        ASSESSMENT:  53 y.o. male  presenting with Right lower extremity wound, which has been unrelieved by conservative treatment.     PLAN:  Patient was educated on the details of the procedure as well as medically reasonable risks, benefits, alternatives and prognosis. Patient was educated to their apparent understanding on potential complications including but not limited to infection, recurrence, persistent pain, swelling, disability, nerve injury/entrapment, healing complications, complications with anesthesia, and deep vein thrombosis/pulmonary embolism. All questions were answered to the patient’s apparent satisfaction.  No guarantees were given as to the outcome of the surgery. Patient was consented for Right lower extremity debridement.    ERAS patient?: No    COVID-19 Risk Consent:   Surgeon has reviewed the key risks " related to joshua COVID-19 and subsequent sequelae.     01/10/24 at 11:44 AM - Rajat Zarate DPM

## 2024-01-11 NOTE — OP NOTE
PODIATRIC OPERATIVE REPORT    LOG ID: [id number]  SURGERY/PROCEDURE DATE: 1/10/2024  OR LOCATION: [facility]    SURGEON(S)/PROCEDURALIST(S) AND ASSISTANT(S):  Primary: Albert Savage DPM  Assistants:   David Figueroa DPM Fellow  Kristan Wick, JANE PGY-2  Rajat Zarate DPM PGY-2    OTHER OR STAFF:  Circulator: Adrien Ortega RN; Kimberly Garduno RN  Scrub Person: Aysha Corey    SURGERY/PROCEDURE(S):  Incision bone cortex, right foot ( 34221)  Excision of chronic ulceration with rotational skin flap, right foot (39569)      PRE-OP/PRE-PROCEDURE DIAGNOSIS:   Chronic osteomyelitis, right foot (M86. 671)   Chronic ulceration down to and including level of bone, right foot ( L97. 514)    POST-OP/POST-PROCEDURE DIAGNOSIS: Same as Pre-Op    ANESTHESIA:   Anesthesia: Consult  ASA: III  Anesthesia Staff: Anesthesiologist: Dillon Wheatley DO  CRNA: VENANCIO Pickett  Intra-op Medications: * No intraprocedure medications in log *    ESTIMATED BLOOD LOSS: less than 50 mL    SPECIMENS:   Order Name Source Comment Collection Info Order Time   TISSUE/WOUND CULTURE/SMEAR ABSCESS Pre-op diagnosis:  Chronic nonpressure ulceration down to bone, right. Localized edema. Collected By: Albert Savage DPM 1/10/2024  1:41 PM     Release result to MyChart   Immediate        BASIC METABOLIC PANEL Blood, Venous  Collected By: Mackenzie Bergeron RN 1/10/2024 11:45 AM     Release result to MyChart   Immediate            IMPLANTABLE DEVICES:  Nothing was implanted during the procedure    FINDINGS: Intraoperative findings consistent with clinical and radiographic findings.    TOURNIQUET TIMES:   none used    COMPLICATIONS: None; patient tolerated the procedure well.       SURGERY/PROCEDURE DETAILS:  INDICATIONS FOR PROCEDURE:   The patient with diagnosis as outlined above presents for podiatric surgical intervention today. The patient has attempted and failed conservative treatment as outlined in preoperative clinic notes and  wishes to proceed with surgical intervention at this time. The nature of the deformity, problems anticipated procedures, recovery/convalescence, risks/complications including but not limited to numbness, CRPS, over/under correction, problems healing of soft tissue or bone, postoperative wound infection, wound dehiscence, DVT and/or persistent pain/disability have been explained to the patient in detail. An updated H&P and consent have been completed prior to today’s surgical intervention. The patient states that they have been NPO since midnight. No guarantees were given or implied, but it is expected that the patient will have a favorable outcome.  It is with this understanding that we proceed.     PRE-PROCEDURE INFORMATION:  In pre-op holding area, the  right extremity to be operated on was clearly marked and the patient verified correct laterality of the marking.  The patient was brought to the operating room and  remain on the cart in the  supine position.  A timeout was performed in which identification of the correct patient, procedure, location, and materials was done.  No tourniquet was used. Following  mac sedation, local anesthesia was obtained utilizing  10cc of  0.5% Marcaine plain. The  right foot was then scrubbed, prepped and draped in the usual aseptic manner. An Esmarch bandage was then utilized to exsanguinate the patient's  right foot and t was left in place for a short term temporary tourniquet.    DESCRIPTION OF PROCEDURE:   Procedure 1:   Attention was directed to the anterior aspect of the patient's right foot.  The full-thickness ulceration was noted.  Upon inspection of the wound it was determined that the wound extended down to the level of bone.  A rongeur was then used to sharply excise all nonviable bone and tissue from the patient's right foot.  The bone cortex was then incised and allowed to drain any infected fluid.   A culture swab was then taken and passed the back table to be sent  to microbiology for full   Evaluation.    Procedure 2:   A 3:1 elliptical incision was marked out around the ulceration with a skin marker plantarly. A #15 blade was used to make the incision full thickness; the skin was then excisied sharply. All bleeders were cauterized with bovie.   The surrounding tissue was fully mobilized at this time to allow for advancement of the surrounding skin flaps to allow for a full closure of the ulceration at this time. At this time once the tissue was freed from the   Surrounding bone and soft tissue, it was seen that adequate mobility was achieved to allow for proper rotation of the flap for closure. The flap was handeled with care to not put too much tension on the skin edges to maintain viability. 3 L of normal saline was then used to flush the surgical site via pulse lavage.  The wound was then closed  using 0 Prolene in a vertical mattress technique as well as simple interrupted sutures.      A dressing consisting of Betadine-soaked gauze, 4x4s, Kerlix, Coban, cast padding and a double six-inch Ace bandage.    POSTOPERATIVE INFORMATION: The patient tolerated the above noted procedure and anesthesia well and was transferred to the PACU with vital signs stable, and vascular status intact with capillary refill intact to the most distal aspect of the operative extremity. Postoperative instructions reviewed in detail with the patient with written instructions provided. Patient will return to clinic in approximately 3-5 days for first postoperative visit. Patient has the number of the clinic and was instructed to call prior to that time should any problems, questions, or concerns arise.    This is David Figueroa DPM dictating the operative note for Dr. Hussein DPM.        SIGNATURE: David Figueroa DPM PATIENT NAME: Lazarus Truong   DATE: January 11, 2024 MRN: [id number]   TIME: 10:43 AM

## 2024-01-13 LAB
BACTERIA SPEC CULT: ABNORMAL
BACTERIA SPEC CULT: ABNORMAL
GRAM STN SPEC: ABNORMAL
GRAM STN SPEC: ABNORMAL

## 2024-07-09 ENCOUNTER — ANESTHESIA EVENT (OUTPATIENT)
Dept: OPERATING ROOM | Facility: HOSPITAL | Age: 54
End: 2024-07-09
Payer: MEDICAID

## 2024-07-09 NOTE — DISCHARGE INSTRUCTIONS
PODIATRIC SURGERY POST-OP INSTRUCTIONS    YOU HAD ANESTHESIA:  You must have a responsible adult drive you home and stay with you for the first 24 hours.    Do not drive a car or drink any alcohol for 24 hours after surgery.  Do not do any strenuous work or activity for the next 24 hours.  You may have mild nausea, a sore throat or raspy voice for a few days.    You received a local numbing block to the foot after your surgery. You should expect the surgical extremity to remain numb for the next 6-12 hours.    POST-OP INSTRUCTIONS:  Keep dressing clean, dry and intact until your first post-operative appointment.  Do not remove surgical dressing. You may need to loosen if necessary.   Do not shower unless using a cast protector to protect dressing.  If dressing gets wet, please call office immediately as this can lead to increased risk of infection or wound dehiscence.   Elevate surgical extremity as much as possible to help with pain and swelling.  Place ice pack behind knee of surgical extremity (20 minutes on/20 minutes off while awake). After 24 hours use ice behind the knee as needed for comfort.  Weight Bearing Status: Bear weight as tolerated in CROW device with dressing intact  Please resume all home medications.   Post-Operative Medications:   Post-operative appointment with Dr. Savage for 7/16/24 at 10:30am in [place] office.  Should any problems, questions, or concerns arise, please call the clinic office.  You have been prescribed pain medications- percocet. Please take as indicated on the label.    WHEN TO CALL YOUR DOCTOR:  Fever (>100.4°F or 38.0°C) or chills.  Incision problems such as redness, warmth, swelling, or foul smelling drainage.  Severe nausea or persistent vomiting.  Pain and swelling in your legs, especially if it is only on one side and not the other.  Pain with urination, cloudy urine, or foul smelling urine.  Or if you have any other problems or questions.  CALL 911 or go to the ED if  you have any shortness of breath, difficulty breathing, or chest pain.

## 2024-07-10 ENCOUNTER — HOSPITAL ENCOUNTER (OUTPATIENT)
Dept: CARDIOLOGY | Facility: HOSPITAL | Age: 54
Discharge: HOME | End: 2024-07-10
Payer: MEDICAID

## 2024-07-10 ENCOUNTER — PHARMACY VISIT (OUTPATIENT)
Dept: PHARMACY | Facility: CLINIC | Age: 54
End: 2024-07-10
Payer: MEDICAID

## 2024-07-10 ENCOUNTER — ANESTHESIA (OUTPATIENT)
Dept: OPERATING ROOM | Facility: HOSPITAL | Age: 54
End: 2024-07-10
Payer: MEDICAID

## 2024-07-10 ENCOUNTER — HOSPITAL ENCOUNTER (OUTPATIENT)
Facility: HOSPITAL | Age: 54
Setting detail: OUTPATIENT SURGERY
Discharge: HOME | End: 2024-07-10
Attending: PODIATRIST | Admitting: PODIATRIST
Payer: MEDICAID

## 2024-07-10 VITALS
DIASTOLIC BLOOD PRESSURE: 61 MMHG | TEMPERATURE: 97.3 F | WEIGHT: 315 LBS | SYSTOLIC BLOOD PRESSURE: 93 MMHG | BODY MASS INDEX: 53.61 KG/M2 | HEART RATE: 68 BPM | RESPIRATION RATE: 17 BRPM | OXYGEN SATURATION: 100 %

## 2024-07-10 DIAGNOSIS — G89.18 POST-OP PAIN: Primary | ICD-10-CM

## 2024-07-10 DIAGNOSIS — L97.512 ULCER OF TOE OF RIGHT FOOT, WITH FAT LAYER EXPOSED (MULTI): ICD-10-CM

## 2024-07-10 PROBLEM — E11.40 DIABETIC NEUROPATHY (MULTI): Status: ACTIVE | Noted: 2024-07-10

## 2024-07-10 PROBLEM — E11.9 DIABETES MELLITUS, TYPE 2 (MULTI): Status: ACTIVE | Noted: 2024-07-10

## 2024-07-10 LAB
ALBUMIN SERPL-MCNC: 3.7 G/DL (ref 3.5–5)
ALP BLD-CCNC: 125 U/L (ref 35–125)
ALT SERPL-CCNC: 10 U/L (ref 5–40)
ANION GAP SERPL CALC-SCNC: 8 MMOL/L
AST SERPL-CCNC: 15 U/L (ref 5–40)
ATRIAL RATE: 76 BPM
BASOPHILS # BLD AUTO: 0.04 X10*3/UL (ref 0–0.1)
BASOPHILS NFR BLD AUTO: 0.6 %
BILIRUB SERPL-MCNC: 0.4 MG/DL (ref 0.1–1.2)
BUN SERPL-MCNC: 23 MG/DL (ref 8–25)
CALCIUM SERPL-MCNC: 8.8 MG/DL (ref 8.5–10.4)
CHLORIDE SERPL-SCNC: 101 MMOL/L (ref 97–107)
CO2 SERPL-SCNC: 25 MMOL/L (ref 24–31)
CREAT SERPL-MCNC: 1.1 MG/DL (ref 0.4–1.6)
EGFRCR SERPLBLD CKD-EPI 2021: 80 ML/MIN/1.73M*2
EOSINOPHIL # BLD AUTO: 0.17 X10*3/UL (ref 0–0.7)
EOSINOPHIL NFR BLD AUTO: 2.6 %
ERYTHROCYTE [DISTWIDTH] IN BLOOD BY AUTOMATED COUNT: 13.4 % (ref 11.5–14.5)
GLUCOSE BLD MANUAL STRIP-MCNC: 130 MG/DL (ref 74–99)
GLUCOSE SERPL-MCNC: 135 MG/DL (ref 65–99)
HCT VFR BLD AUTO: 43.5 % (ref 41–52)
HGB BLD-MCNC: 13.5 G/DL (ref 13.5–17.5)
IMM GRANULOCYTES # BLD AUTO: 0.02 X10*3/UL (ref 0–0.7)
IMM GRANULOCYTES NFR BLD AUTO: 0.3 % (ref 0–0.9)
LYMPHOCYTES # BLD AUTO: 2.04 X10*3/UL (ref 1.2–4.8)
LYMPHOCYTES NFR BLD AUTO: 31.8 %
MCH RBC QN AUTO: 26.7 PG (ref 26–34)
MCHC RBC AUTO-ENTMCNC: 31 G/DL (ref 32–36)
MCV RBC AUTO: 86 FL (ref 80–100)
MONOCYTES # BLD AUTO: 0.5 X10*3/UL (ref 0.1–1)
MONOCYTES NFR BLD AUTO: 7.8 %
NEUTROPHILS # BLD AUTO: 3.65 X10*3/UL (ref 1.2–7.7)
NEUTROPHILS NFR BLD AUTO: 56.9 %
NRBC BLD-RTO: 0 /100 WBCS (ref 0–0)
P AXIS: 45 DEGREES
P OFFSET: 212 MS
P ONSET: 146 MS
PLATELET # BLD AUTO: 223 X10*3/UL (ref 150–450)
POTASSIUM SERPL-SCNC: 5.1 MMOL/L (ref 3.4–5.1)
PR INTERVAL: 148 MS
PROT SERPL-MCNC: 8.6 G/DL (ref 5.9–7.9)
Q ONSET: 220 MS
QRS COUNT: 12 BEATS
QRS DURATION: 154 MS
QT INTERVAL: 404 MS
QTC CALCULATION(BAZETT): 454 MS
QTC FREDERICIA: 436 MS
R AXIS: -29 DEGREES
RBC # BLD AUTO: 5.05 X10*6/UL (ref 4.5–5.9)
SODIUM SERPL-SCNC: 134 MMOL/L (ref 133–145)
T AXIS: 5 DEGREES
T OFFSET: 422 MS
VENTRICULAR RATE: 76 BPM
WBC # BLD AUTO: 6.4 X10*3/UL (ref 4.4–11.3)

## 2024-07-10 PROCEDURE — 84075 ASSAY ALKALINE PHOSPHATASE: CPT

## 2024-07-10 PROCEDURE — 7100000001 HC RECOVERY ROOM TIME - INITIAL BASE CHARGE: Performed by: PODIATRIST

## 2024-07-10 PROCEDURE — 36415 COLL VENOUS BLD VENIPUNCTURE: CPT

## 2024-07-10 PROCEDURE — 2500000004 HC RX 250 GENERAL PHARMACY W/ HCPCS (ALT 636 FOR OP/ED): Performed by: PODIATRIST

## 2024-07-10 PROCEDURE — 82947 ASSAY GLUCOSE BLOOD QUANT: CPT | Mod: 59

## 2024-07-10 PROCEDURE — 85025 COMPLETE CBC W/AUTO DIFF WBC: CPT

## 2024-07-10 PROCEDURE — 93010 ELECTROCARDIOGRAM REPORT: CPT | Performed by: INTERNAL MEDICINE

## 2024-07-10 PROCEDURE — 3600000002 HC OR TIME - INITIAL BASE CHARGE - PROCEDURE LEVEL TWO: Performed by: PODIATRIST

## 2024-07-10 PROCEDURE — 7100000002 HC RECOVERY ROOM TIME - EACH INCREMENTAL 1 MINUTE: Performed by: PODIATRIST

## 2024-07-10 PROCEDURE — A14040 PR ADJ TISS XFER HEAD,FAC,HAND <10 SQCM: Performed by: ANESTHESIOLOGY

## 2024-07-10 PROCEDURE — A14040 PR ADJ TISS XFER HEAD,FAC,HAND <10 SQCM: Performed by: NURSE ANESTHETIST, CERTIFIED REGISTERED

## 2024-07-10 PROCEDURE — 3600000007 HC OR TIME - EACH INCREMENTAL 1 MINUTE - PROCEDURE LEVEL TWO: Performed by: PODIATRIST

## 2024-07-10 PROCEDURE — RXMED WILLOW AMBULATORY MEDICATION CHARGE

## 2024-07-10 PROCEDURE — 7100000010 HC PHASE TWO TIME - EACH INCREMENTAL 1 MINUTE: Performed by: PODIATRIST

## 2024-07-10 PROCEDURE — 2720000007 HC OR 272 NO HCPCS: Performed by: PODIATRIST

## 2024-07-10 PROCEDURE — 3700000002 HC GENERAL ANESTHESIA TIME - EACH INCREMENTAL 1 MINUTE: Performed by: PODIATRIST

## 2024-07-10 PROCEDURE — 2500000004 HC RX 250 GENERAL PHARMACY W/ HCPCS (ALT 636 FOR OP/ED): Mod: JZ

## 2024-07-10 PROCEDURE — 2500000004 HC RX 250 GENERAL PHARMACY W/ HCPCS (ALT 636 FOR OP/ED): Performed by: NURSE ANESTHETIST, CERTIFIED REGISTERED

## 2024-07-10 PROCEDURE — 93005 ELECTROCARDIOGRAM TRACING: CPT

## 2024-07-10 PROCEDURE — 3700000001 HC GENERAL ANESTHESIA TIME - INITIAL BASE CHARGE: Performed by: PODIATRIST

## 2024-07-10 PROCEDURE — 87186 SC STD MICRODIL/AGAR DIL: CPT | Mod: TRILAB,WESLAB | Performed by: PODIATRIST

## 2024-07-10 PROCEDURE — 7100000009 HC PHASE TWO TIME - INITIAL BASE CHARGE: Performed by: PODIATRIST

## 2024-07-10 RX ORDER — FENTANYL CITRATE 50 UG/ML
INJECTION, SOLUTION INTRAMUSCULAR; INTRAVENOUS AS NEEDED
Status: DISCONTINUED | OUTPATIENT
Start: 2024-07-10 | End: 2024-07-10

## 2024-07-10 RX ORDER — FENTANYL CITRATE 50 UG/ML
25 INJECTION, SOLUTION INTRAMUSCULAR; INTRAVENOUS EVERY 5 MIN PRN
Status: DISCONTINUED | OUTPATIENT
Start: 2024-07-10 | End: 2024-07-10 | Stop reason: HOSPADM

## 2024-07-10 RX ORDER — BUPIVACAINE HYDROCHLORIDE 5 MG/ML
INJECTION, SOLUTION PERINEURAL AS NEEDED
Status: DISCONTINUED | OUTPATIENT
Start: 2024-07-10 | End: 2024-07-10 | Stop reason: HOSPADM

## 2024-07-10 RX ORDER — OXYCODONE AND ACETAMINOPHEN 5; 325 MG/1; MG/1
1 TABLET ORAL EVERY 6 HOURS PRN
Qty: 12 TABLET | Refills: 0 | Status: SHIPPED | OUTPATIENT
Start: 2024-07-10 | End: 2024-07-19 | Stop reason: HOSPADM

## 2024-07-10 RX ORDER — SODIUM CHLORIDE, SODIUM LACTATE, POTASSIUM CHLORIDE, CALCIUM CHLORIDE 600; 310; 30; 20 MG/100ML; MG/100ML; MG/100ML; MG/100ML
100 INJECTION, SOLUTION INTRAVENOUS CONTINUOUS
Status: DISCONTINUED | OUTPATIENT
Start: 2024-07-10 | End: 2024-07-10 | Stop reason: HOSPADM

## 2024-07-10 RX ORDER — SODIUM CHLORIDE, SODIUM LACTATE, POTASSIUM CHLORIDE, CALCIUM CHLORIDE 600; 310; 30; 20 MG/100ML; MG/100ML; MG/100ML; MG/100ML
50 INJECTION, SOLUTION INTRAVENOUS CONTINUOUS
Status: DISCONTINUED | OUTPATIENT
Start: 2024-07-10 | End: 2024-07-10 | Stop reason: HOSPADM

## 2024-07-10 RX ORDER — MIDAZOLAM HYDROCHLORIDE 1 MG/ML
INJECTION, SOLUTION INTRAMUSCULAR; INTRAVENOUS AS NEEDED
Status: DISCONTINUED | OUTPATIENT
Start: 2024-07-10 | End: 2024-07-10

## 2024-07-10 RX ORDER — CLINDAMYCIN PHOSPHATE 600 MG/50ML
600 INJECTION, SOLUTION INTRAVENOUS ONCE
Status: COMPLETED | OUTPATIENT
Start: 2024-07-10 | End: 2024-07-10

## 2024-07-10 RX ORDER — FENTANYL CITRATE 50 UG/ML
50 INJECTION, SOLUTION INTRAMUSCULAR; INTRAVENOUS EVERY 5 MIN PRN
Status: DISCONTINUED | OUTPATIENT
Start: 2024-07-10 | End: 2024-07-10 | Stop reason: HOSPADM

## 2024-07-10 SDOH — HEALTH STABILITY: MENTAL HEALTH: CURRENT SMOKER: 0

## 2024-07-10 ASSESSMENT — ENCOUNTER SYMPTOMS: CONSTITUTIONAL NEGATIVE: 1

## 2024-07-10 ASSESSMENT — PAIN - FUNCTIONAL ASSESSMENT
PAIN_FUNCTIONAL_ASSESSMENT: 0-10

## 2024-07-10 ASSESSMENT — COLUMBIA-SUICIDE SEVERITY RATING SCALE - C-SSRS
1. IN THE PAST MONTH, HAVE YOU WISHED YOU WERE DEAD OR WISHED YOU COULD GO TO SLEEP AND NOT WAKE UP?: NO
2. HAVE YOU ACTUALLY HAD ANY THOUGHTS OF KILLING YOURSELF?: NO
6. HAVE YOU EVER DONE ANYTHING, STARTED TO DO ANYTHING, OR PREPARED TO DO ANYTHING TO END YOUR LIFE?: NO

## 2024-07-10 ASSESSMENT — PAIN SCALES - GENERAL
PAIN_LEVEL: 0
PAINLEVEL_OUTOF10: 0 - NO PAIN

## 2024-07-10 NOTE — ANESTHESIA POSTPROCEDURE EVALUATION
Patient: Lazarus Truong    Procedure Summary       Date: 07/10/24 Room / Location: [facility] / [facility]    Anesthesia Start: 1110 Anesthesia Stop: 1140    Procedure: Excision of Ulceration with Advancement Flap Closure (Right: Foot) Diagnosis:       Ulcer of toe of right foot, with fat layer exposed (Multi)      (Chronic ulceration, right foot.)    Surgeons: Albert Savage DPM Responsible Provider: Fabiola Ohara MD MPH    Anesthesia Type: MAC ASA Status: 4            Anesthesia Type: MAC    Vitals Value Taken Time   /57 07/10/24 1140   Temp 36 07/10/24 1140   Pulse 69 07/10/24 1140   Resp 16 07/10/24 1140   SpO2 98 07/10/24 1140       Anesthesia Post Evaluation    Patient location during evaluation: PACU  Patient participation: complete - patient participated  Level of consciousness: awake  Pain score: 0  Pain management: adequate  Multimodal analgesia pain management approach  Airway patency: patent  Two or more strategies used to mitigate risk of obstructive sleep apnea  Cardiovascular status: acceptable  Respiratory status: acceptable  Hydration status: acceptable  Postoperative Nausea and Vomiting: none  Comments: No Nausea      There were no known notable events for this encounter.

## 2024-07-10 NOTE — POST-PROCEDURE NOTE
1220- pt brought back from pacu, verbal report received. Pt is alert and awake. Mother at bedside. Snack and drink given.  Rx to go called.    1255- vss. Discharge instructions given and explained to pt and mother. Both verbally understood. Pt tolerating snack and drink.    1300- pt getting dressed at this time.    1323- transport at bedside for discharge.

## 2024-07-10 NOTE — ANESTHESIA PREPROCEDURE EVALUATION
Patient: Lazarus Truong    Procedure Information       Date/Time: 07/10/24 1045    Procedure: Excision of Ulceration with Advancement Flap Closure (Right: Foot) - *AOA*    Location: [facility] / [facility]    Surgeons: Albert Savage DPM            Relevant Problems   Anesthesia (within normal limits)      Cardiac  CELESTE 5/24/23:    CONCLUSIONS:  1. Left ventricular systolic function is moderately decreased with a 30-35%  estimated ejection fraction.  2. The left atrium is moderate to severely dilated.  3. Mild to moderate tricuspid regurgitation.  4. No left atrial mass.  5. No left atrial thrombus.  6. There is no evidence of a patent foramen ovale.  7. There is global hypokinesis of the left ventricle with minor regional  variations.    Pt states he has not seen a cardiologist outside of the hospital   (+) Atrial fibrillation (Multi)   (+) CAD (coronary artery disease)      Pulmonary   (+) Asthma (HHS-HCC)      Neuro (within normal limits)      GI (within normal limits)      /Renal (within normal limits)      Liver (within normal limits)      Endocrine   (+) Diabetes mellitus type 1 (Multi)   (+) Diabetes mellitus, type 2 (Multi)   (+) Diabetic neuropathy (Multi)   (+) Obesity      Hematology (within normal limits)      Musculoskeletal (within normal limits)      HEENT (within normal limits)      ID (within normal limits)      Skin (within normal limits)      GYN (within normal limits)       Clinical information reviewed:   Tobacco  Allergies  Meds   Med Hx  Surg Hx   Fam Hx  Soc Hx        NPO Detail:  NPO/Void Status  Carbohydrate Drink Given Prior to Surgery? : N  Date of Last Liquid: 07/09/24  Time of Last Liquid: 2355  Date of Last Solid: 07/09/24  Time of Last Solid: 2350  Last Intake Type: Clear fluids  Time of Last Void: 0100        Chemistry    Lab Results   Component Value Date/Time     01/10/2024 1146    K 4.7 01/10/2024 1146     01/10/2024 1146    CO2 24 01/10/2024 1146    BUN  17 01/10/2024 1146    CREATININE 0.90 01/10/2024 1146    Lab Results   Component Value Date/Time    CALCIUM 8.9 01/10/2024 1146    ALKPHOS 125 09/13/2023 0824    AST 14 09/13/2023 0824    ALT 11 09/13/2023 0824    BILITOT 0.2 09/13/2023 0824        Lab Results   Component Value Date    WBC 6.4 07/10/2024    HGB 13.5 07/10/2024    HCT 43.5 07/10/2024    MCV 86 07/10/2024     07/10/2024     Today's labs pending     Physical Exam    Airway  Mallampati: III  TM distance: >3 FB  Neck ROM: full     Cardiovascular - normal exam     Dental    Pulmonary - normal exam     Abdominal   (+) obese         Anesthesia Plan    History of general anesthesia?: yes  History of complications of general anesthesia?: no    ASA 4     MAC   (Pt non-compliant.  Does not follow w/cardiology and did not go to his recent PCP visit because of ride issues.  D/W pt that w/o being optimized for surgery, his perioperative risk is increased.    Discussed plan w/surgeon - OK for light sedation.)  The patient is not a current smoker.  Patient was not previously instructed to abstain from smoking on day of procedure.  Patient did not smoke on day of procedure.  Education provided regarding risk of obstructive sleep apnea.  intravenous induction   Postoperative administration of opioids is intended.  Trial extubation is planned.  Anesthetic plan and risks discussed with patient.  Use of blood products discussed with patient who consented to blood products.    Plan discussed with CRNA and CAA.

## 2024-07-10 NOTE — H&P (VIEW-ONLY)
History Of Present Illness  Patient Lazarus Truong is a 53 y.o. male presenting with right foot chronic ulcer. 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 Ulcer has improved to a degree that it may be closed with advancement flap closure. Denies constitutional symptoms, has been NPO overnight.     Past Medical History  Past Medical History:   Diagnosis Date    Anxiety     Asthma (HHS-HCC)     Diabetes mellitus (Multi)     Encounter for other preprocedural examination 07/02/2017    Pre-operative clearance    Irregular heart beat     ST elevation (STEMI) myocardial infarction involving other coronary artery of anterior wall (Multi)     Anterior myocardial infarction    Unspecified open wound, unspecified foot, initial encounter 06/20/2017    Open wound of foot       Surgical History  Past Surgical History:   Procedure Laterality Date    FOOT SURGERY          Social History  He reports that he has never smoked. He has never used smokeless tobacco. He reports current alcohol use of about 2.0 standard drinks of alcohol per week. He reports that he does not use drugs.    Family History  Family History   Problem Relation Name Age of Onset    Diabetes Mother      Glaucoma Father      Hypertension Father      Diabetes Father          Allergies  Penicillins and Shellfish containing products    Review of Systems   Constitutional: Negative.         Physical Exam  Cardiovascular:      Rate and Rhythm: Normal rate.   Pulmonary:      Effort: Pulmonary effort is normal.   Skin:     Capillary Refill: Capillary refill takes less than 2 seconds.   Neurological:      Mental Status: He is alert.     DP/PT pulses right foot faint 2/2 edema, brisk CFT to hallux.  Ulcer overlying medial ankle with hypergranular wound bed no SOI.     Last Recorded Vitals  Blood pressure 143/75, pulse 79, temperature 36.2 °C  (97.2 °F), temperature source Temporal, resp. rate 16, weight (!) 165 kg (363 lb), SpO2 97%.    Relevant Results  Results for orders placed or performed during the hospital encounter of 07/10/24 (from the past 24 hour(s))   CBC and Auto Differential   Result Value Ref Range    WBC 6.4 4.4 - 11.3 x10*3/uL    nRBC 0.0 0.0 - 0.0 /100 WBCs    RBC 5.05 4.50 - 5.90 x10*6/uL    Hemoglobin 13.5 13.5 - 17.5 g/dL    Hematocrit 43.5 41.0 - 52.0 %    MCV 86 80 - 100 fL    MCH 26.7 26.0 - 34.0 pg    MCHC 31.0 (L) 32.0 - 36.0 g/dL    RDW 13.4 11.5 - 14.5 %    Platelets 223 150 - 450 x10*3/uL    Neutrophils % 56.9 40.0 - 80.0 %    Immature Granulocytes %, Automated 0.3 0.0 - 0.9 %    Lymphocytes % 31.8 13.0 - 44.0 %    Monocytes % 7.8 2.0 - 10.0 %    Eosinophils % 2.6 0.0 - 6.0 %    Basophils % 0.6 0.0 - 2.0 %    Neutrophils Absolute 3.65 1.20 - 7.70 x10*3/uL    Immature Granulocytes Absolute, Automated 0.02 0.00 - 0.70 x10*3/uL    Lymphocytes Absolute 2.04 1.20 - 4.80 x10*3/uL    Monocytes Absolute 0.50 0.10 - 1.00 x10*3/uL    Eosinophils Absolute 0.17 0.00 - 0.70 x10*3/uL    Basophils Absolute 0.04 0.00 - 0.10 x10*3/uL   Comprehensive Metabolic Panel   Result Value Ref Range    Glucose 135 (H) 65 - 99 mg/dL    Sodium 134 133 - 145 mmol/L    Potassium 5.1 3.4 - 5.1 mmol/L    Chloride 101 97 - 107 mmol/L    Bicarbonate 25 24 - 31 mmol/L    Urea Nitrogen 23 8 - 25 mg/dL    Creatinine 1.10 0.40 - 1.60 mg/dL    eGFR 80 >60 mL/min/1.73m*2    Calcium 8.8 8.5 - 10.4 mg/dL    Albumin 3.7 3.5 - 5.0 g/dL    Alkaline Phosphatase 125 35 - 125 U/L    Total Protein 8.6 (H) 5.9 - 7.9 g/dL    AST 15 5 - 40 U/L    Bilirubin, Total 0.4 0.1 - 1.2 mg/dL    ALT 10 5 - 40 U/L    Anion Gap 8 <=19 mmol/L   POCT GLUCOSE   Result Value Ref Range    POCT Glucose 130 (H) 74 - 99 mg/dL             Assessment/Plan     #Chronic non-pressure ulcer right foot to subcutaneous layer  #Lymphedema RLE    Plan:  Patient seen and evaluated. All questions answered all  findings discussed. Patient understanding.  All risks and benefits of surgery reviewed.  Excision of Ulceration with Advancement Flap Closure: 81766 (CPT®)  Patient to be WBAT in CAM boot  Plan reviewed with Dr. Savage and Dr. Figueroa.        Darwin Mejias, MICHAM/PGY-3

## 2024-07-10 NOTE — H&P
History Of Present Illness  Patient Lazarus Truong is a 53 y.o. male presenting with right foot chronic ulcer. 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 Ulcer has improved to a degree that it may be closed with advancement flap closure. Denies constitutional symptoms, has been NPO overnight.     Past Medical History  Past Medical History:   Diagnosis Date    Anxiety     Asthma (HHS-HCC)     Diabetes mellitus (Multi)     Encounter for other preprocedural examination 07/02/2017    Pre-operative clearance    Irregular heart beat     ST elevation (STEMI) myocardial infarction involving other coronary artery of anterior wall (Multi)     Anterior myocardial infarction    Unspecified open wound, unspecified foot, initial encounter 06/20/2017    Open wound of foot       Surgical History  Past Surgical History:   Procedure Laterality Date    FOOT SURGERY          Social History  He reports that he has never smoked. He has never used smokeless tobacco. He reports current alcohol use of about 2.0 standard drinks of alcohol per week. He reports that he does not use drugs.    Family History  Family History   Problem Relation Name Age of Onset    Diabetes Mother      Glaucoma Father      Hypertension Father      Diabetes Father          Allergies  Penicillins and Shellfish containing products    Review of Systems   Constitutional: Negative.         Physical Exam  Cardiovascular:      Rate and Rhythm: Normal rate.   Pulmonary:      Effort: Pulmonary effort is normal.   Skin:     Capillary Refill: Capillary refill takes less than 2 seconds.   Neurological:      Mental Status: He is alert.     DP/PT pulses right foot faint 2/2 edema, brisk CFT to hallux.  Ulcer overlying medial ankle with hypergranular wound bed no SOI.     Last Recorded Vitals  Blood pressure 143/75, pulse 79, temperature 36.2 °C  (97.2 °F), temperature source Temporal, resp. rate 16, weight (!) 165 kg (363 lb), SpO2 97%.    Relevant Results  Results for orders placed or performed during the hospital encounter of 07/10/24 (from the past 24 hour(s))   CBC and Auto Differential   Result Value Ref Range    WBC 6.4 4.4 - 11.3 x10*3/uL    nRBC 0.0 0.0 - 0.0 /100 WBCs    RBC 5.05 4.50 - 5.90 x10*6/uL    Hemoglobin 13.5 13.5 - 17.5 g/dL    Hematocrit 43.5 41.0 - 52.0 %    MCV 86 80 - 100 fL    MCH 26.7 26.0 - 34.0 pg    MCHC 31.0 (L) 32.0 - 36.0 g/dL    RDW 13.4 11.5 - 14.5 %    Platelets 223 150 - 450 x10*3/uL    Neutrophils % 56.9 40.0 - 80.0 %    Immature Granulocytes %, Automated 0.3 0.0 - 0.9 %    Lymphocytes % 31.8 13.0 - 44.0 %    Monocytes % 7.8 2.0 - 10.0 %    Eosinophils % 2.6 0.0 - 6.0 %    Basophils % 0.6 0.0 - 2.0 %    Neutrophils Absolute 3.65 1.20 - 7.70 x10*3/uL    Immature Granulocytes Absolute, Automated 0.02 0.00 - 0.70 x10*3/uL    Lymphocytes Absolute 2.04 1.20 - 4.80 x10*3/uL    Monocytes Absolute 0.50 0.10 - 1.00 x10*3/uL    Eosinophils Absolute 0.17 0.00 - 0.70 x10*3/uL    Basophils Absolute 0.04 0.00 - 0.10 x10*3/uL   Comprehensive Metabolic Panel   Result Value Ref Range    Glucose 135 (H) 65 - 99 mg/dL    Sodium 134 133 - 145 mmol/L    Potassium 5.1 3.4 - 5.1 mmol/L    Chloride 101 97 - 107 mmol/L    Bicarbonate 25 24 - 31 mmol/L    Urea Nitrogen 23 8 - 25 mg/dL    Creatinine 1.10 0.40 - 1.60 mg/dL    eGFR 80 >60 mL/min/1.73m*2    Calcium 8.8 8.5 - 10.4 mg/dL    Albumin 3.7 3.5 - 5.0 g/dL    Alkaline Phosphatase 125 35 - 125 U/L    Total Protein 8.6 (H) 5.9 - 7.9 g/dL    AST 15 5 - 40 U/L    Bilirubin, Total 0.4 0.1 - 1.2 mg/dL    ALT 10 5 - 40 U/L    Anion Gap 8 <=19 mmol/L   POCT GLUCOSE   Result Value Ref Range    POCT Glucose 130 (H) 74 - 99 mg/dL             Assessment/Plan     #Chronic non-pressure ulcer right foot to subcutaneous layer  #Lymphedema RLE    Plan:  Patient seen and evaluated. All questions answered all  findings discussed. Patient understanding.  All risks and benefits of surgery reviewed.  Excision of Ulceration with Advancement Flap Closure: 87751 (CPT®)  Patient to be WBAT in CAM boot  Plan reviewed with Dr. Savage and Dr. Figueroa.        Darwin Mejias, MICHAM/PGY-3

## 2024-07-11 NOTE — OP NOTE
Podiatry Op Note: Excision of Ulceration with Advancement Flap Closure (R)     Date: 7/10/2024  OR Location: [facility]    Name: Lazarus Truong, : 1970, Age: 53 y.o., MRN: [id number], Sex: male    Diagnosis  Pre-op Diagnosis      * Ulcer of toe of right foot, with fat layer exposed (Multi) [L97.512] Post-op Diagnosis     * Ulcer of toe of right foot, with fat layer exposed (Multi) [L97.512]     Procedures  Excision of Ulceration with Advancement Flap Closure- 87279 - TN ADJT TIS TRNS/REARGMT F/C/C/M/N/A/G/H/F 10SQCM/<    Surgeons      * Albert Savage - Primary    Resident/Fellow/Other Assistant:  Surgeons and Role:     * Darwin Mejias DPM - Assisting  David Figueroa DPM/fellow        Procedure Summary  Anesthesia: Monitor Anesthesia Care  ASA: IV  Anesthesia Staff: Anesthesiologist: Fabiola Ohara MD MPH  CRNA: JESSICA Zepeda-CRNA  Estimated Blood Loss: 2 mL  Intra-op Medications:   Administrations occurring from 1045 to 1205 on 07/10/24:   Medication Name Total Dose   clindamycin (Cleocin) 600 mg in dextrose 5% water 50 mL 600 mg   lactated Ringer's infusion Cannot be calculated              Anesthesia Record               Intraprocedure I/O Totals          Intake    lactated Ringer's infusion 400.00 mL    clindamycin (Cleocin) 600 mg in dextrose 5% water 50 mL 50.00 mL    Total Intake 450 mL       Output    Est. Blood Loss 2 mL    Total Output 2 mL       Net    Net Volume 448 mL          Specimen:   ID Type Source Tests Collected by Time   A : right medial ankle swab Swab SKIN INCISIONAL BIOPSY TISSUE/WOUND CULTURE/SMEAR Albert Savage DPM 7/10/2024 1130        Staff:   Circulator: Vladislav Foote Person: Juanjo         Drains and/or Catheters: * None in log *    Tourniquet Times:         Implants: N/A    Findings: Consistent with pre-op diagnosis.    Indications: Patient Lazarus Truong is an 53 y.o. male who is having surgery for Chronic ulceration, right foot.. He has a history of charcot ankle  with reconstructive surgery to this foot and ankle an external fixator applied but one of the pin sites failed to improve due to lymphedema. Ulcer has improved to a degree that it may be closed with advancement flap closure. Denies constitutional symptoms, has been NPO overnight.     The patient was seen in the preoperative area. The risks, benefits, complications, treatment options, non-operative alternatives, expected recovery and outcomes were discussed with the patient. The possibilities of reaction to medication, pulmonary aspiration, injury to surrounding structures, bleeding, recurrent infection, the need for additional procedures, failure to diagnose a condition, and creating a complication requiring transfusion or operation were discussed with the patient. The patient concurred with the proposed plan, giving informed consent.  The site of surgery was properly noted/marked if necessary per policy. The patient has been actively warmed in preoperative area. Preoperative antibiotics have been ordered and given within 1 hours of incision. Venous thrombosis prophylaxis have been ordered including unilateral sequential compression device.    Pre-procedure information:  In pre-op holding area, the  right extremity to be operated on was clearly marked and the patient verified correct laterality of the marking.  The patient was brought to the operating room and  remain on the cart in the  supine position.  A timeout was performed in which identification of the correct patient, procedure, location, and materials was done.  No tourniquet was used. Following  mac sedation, local anesthesia was obtained utilizing  10cc of  0.5% Marcaine plain. The  right foot was then scrubbed, prepped and draped in the usual aseptic manner. An Esmarch bandage was then utilized to exsanguinate the patient's  right foot and t was left in place for a short term temporary tourniquet.    Procedure 1:  A 3:1 elliptical incision was marked out  around the ulceration with a skin marker plantarly. A #15 blade was used to make the incision full thickness; the skin was then excisied sharply. All bleeders were cauterized with bovie. Due to the chronicity and depth, a wound culture swab was collected to be sent to micro. The surrounding tissue was fully mobilized at this time to allow for advancement of the surrounding skin flaps to allow for a full closure of the ulceration at this time. At this time once the tissue was freed from from deep subcutaneous layer.  Surrounding deep soft tissue, it was seen that adequate mobility was achieved to allow for proper advancement of the flap for closure. The flap was handled with care to not put too much tension on the skin edges to maintain viability. A copious amount of saline was then used to flush the surgical site via pulse lavage. The wound was then closed  using 0 Prolene in an inverted cruciate mattress technique as well as simple interrupted sutures.     The incision was then covered with betadine-soaked 4x4s, dry 4x4s, kerlix, 2 layers of specialist padding, and ACE wrap to the level of the knee. Brisk capillary refill noted to there heidy-incision as well as the distal digits right foot.    Complications:  None; patient tolerated the procedure well.    Disposition: PACU - hemodynamically stable.  Condition: stable

## 2024-07-15 LAB
ATRIAL RATE: 76 BPM
P AXIS: 45 DEGREES
P OFFSET: 212 MS
P ONSET: 146 MS
PR INTERVAL: 148 MS
Q ONSET: 220 MS
QRS COUNT: 12 BEATS
QRS DURATION: 154 MS
QT INTERVAL: 404 MS
QTC CALCULATION(BAZETT): 454 MS
QTC FREDERICIA: 436 MS
R AXIS: -29 DEGREES
T AXIS: 5 DEGREES
T OFFSET: 422 MS
VENTRICULAR RATE: 76 BPM

## 2024-07-16 ENCOUNTER — APPOINTMENT (OUTPATIENT)
Dept: RADIOLOGY | Facility: HOSPITAL | Age: 54
End: 2024-07-16
Payer: MEDICAID

## 2024-07-16 ENCOUNTER — HOSPITAL ENCOUNTER (INPATIENT)
Facility: HOSPITAL | Age: 54
LOS: 3 days | Discharge: HOME | End: 2024-07-19
Attending: EMERGENCY MEDICINE | Admitting: PODIATRIST
Payer: MEDICAID

## 2024-07-16 DIAGNOSIS — L08.9 FOOT INFECTION: Primary | ICD-10-CM

## 2024-07-16 DIAGNOSIS — I89.0 LYMPHEDEMA: ICD-10-CM

## 2024-07-16 DIAGNOSIS — L02.611 FOOT ABSCESS, RIGHT: ICD-10-CM

## 2024-07-16 DIAGNOSIS — L03.115 CELLULITIS OF RIGHT FOOT: ICD-10-CM

## 2024-07-16 LAB
ALBUMIN SERPL-MCNC: 3.3 G/DL (ref 3.5–5)
ALP BLD-CCNC: 122 U/L (ref 35–125)
ALT SERPL-CCNC: 11 U/L (ref 5–40)
ANION GAP SERPL CALC-SCNC: 8 MMOL/L
AST SERPL-CCNC: 19 U/L (ref 5–40)
BASOPHILS # BLD AUTO: 0.04 X10*3/UL (ref 0–0.1)
BASOPHILS NFR BLD AUTO: 0.7 %
BILIRUB SERPL-MCNC: 0.3 MG/DL (ref 0.1–1.2)
BUN SERPL-MCNC: 18 MG/DL (ref 8–25)
CALCIUM SERPL-MCNC: 8.8 MG/DL (ref 8.5–10.4)
CHLORIDE SERPL-SCNC: 100 MMOL/L (ref 97–107)
CO2 SERPL-SCNC: 26 MMOL/L (ref 24–31)
CREAT SERPL-MCNC: 1.1 MG/DL (ref 0.4–1.6)
CRP SERPL-MCNC: 3 MG/DL (ref 0–2)
EGFRCR SERPLBLD CKD-EPI 2021: 80 ML/MIN/1.73M*2
EOSINOPHIL # BLD AUTO: 0.17 X10*3/UL (ref 0–0.7)
EOSINOPHIL NFR BLD AUTO: 3.1 %
ERYTHROCYTE [DISTWIDTH] IN BLOOD BY AUTOMATED COUNT: 13.2 % (ref 11.5–14.5)
ERYTHROCYTE [SEDIMENTATION RATE] IN BLOOD BY WESTERGREN METHOD: 97 MM/H (ref 0–20)
GLUCOSE BLD MANUAL STRIP-MCNC: 161 MG/DL (ref 74–99)
GLUCOSE SERPL-MCNC: 136 MG/DL (ref 65–99)
HCT VFR BLD AUTO: 40.4 % (ref 41–52)
HGB BLD-MCNC: 12.8 G/DL (ref 13.5–17.5)
IMM GRANULOCYTES # BLD AUTO: 0.01 X10*3/UL (ref 0–0.7)
IMM GRANULOCYTES NFR BLD AUTO: 0.2 % (ref 0–0.9)
LACTATE BLDV-SCNC: 0.9 MMOL/L (ref 0.4–2)
LYMPHOCYTES # BLD AUTO: 1.96 X10*3/UL (ref 1.2–4.8)
LYMPHOCYTES NFR BLD AUTO: 35.4 %
MCH RBC QN AUTO: 27.2 PG (ref 26–34)
MCHC RBC AUTO-ENTMCNC: 31.7 G/DL (ref 32–36)
MCV RBC AUTO: 86 FL (ref 80–100)
MONOCYTES # BLD AUTO: 0.37 X10*3/UL (ref 0.1–1)
MONOCYTES NFR BLD AUTO: 6.7 %
NEUTROPHILS # BLD AUTO: 2.98 X10*3/UL (ref 1.2–7.7)
NEUTROPHILS NFR BLD AUTO: 53.9 %
NRBC BLD-RTO: 0 /100 WBCS (ref 0–0)
PLATELET # BLD AUTO: 250 X10*3/UL (ref 150–450)
POTASSIUM SERPL-SCNC: 4.9 MMOL/L (ref 3.4–5.1)
PROT SERPL-MCNC: 8 G/DL (ref 5.9–7.9)
RBC # BLD AUTO: 4.71 X10*6/UL (ref 4.5–5.9)
SODIUM SERPL-SCNC: 134 MMOL/L (ref 133–145)
WBC # BLD AUTO: 5.5 X10*3/UL (ref 4.4–11.3)

## 2024-07-16 PROCEDURE — 1100000001 HC PRIVATE ROOM DAILY

## 2024-07-16 PROCEDURE — 73630 X-RAY EXAM OF FOOT: CPT | Mod: RIGHT SIDE | Performed by: RADIOLOGY

## 2024-07-16 PROCEDURE — 2500000004 HC RX 250 GENERAL PHARMACY W/ HCPCS (ALT 636 FOR OP/ED): Mod: JZ

## 2024-07-16 PROCEDURE — 87040 BLOOD CULTURE FOR BACTERIA: CPT | Mod: TRILAB

## 2024-07-16 PROCEDURE — 36415 COLL VENOUS BLD VENIPUNCTURE: CPT | Performed by: EMERGENCY MEDICINE

## 2024-07-16 PROCEDURE — 83605 ASSAY OF LACTIC ACID: CPT | Performed by: EMERGENCY MEDICINE

## 2024-07-16 PROCEDURE — 80053 COMPREHEN METABOLIC PANEL: CPT

## 2024-07-16 PROCEDURE — 85652 RBC SED RATE AUTOMATED: CPT

## 2024-07-16 PROCEDURE — 73721 MRI JNT OF LWR EXTRE W/O DYE: CPT | Mod: RT

## 2024-07-16 PROCEDURE — 96365 THER/PROPH/DIAG IV INF INIT: CPT

## 2024-07-16 PROCEDURE — 73721 MRI JNT OF LWR EXTRE W/O DYE: CPT | Mod: RIGHT SIDE | Performed by: RADIOLOGY

## 2024-07-16 PROCEDURE — 2500000004 HC RX 250 GENERAL PHARMACY W/ HCPCS (ALT 636 FOR OP/ED): Performed by: EMERGENCY MEDICINE

## 2024-07-16 PROCEDURE — 86140 C-REACTIVE PROTEIN: CPT

## 2024-07-16 PROCEDURE — 36415 COLL VENOUS BLD VENIPUNCTURE: CPT

## 2024-07-16 PROCEDURE — 99285 EMERGENCY DEPT VISIT HI MDM: CPT | Mod: 25

## 2024-07-16 PROCEDURE — 73630 X-RAY EXAM OF FOOT: CPT | Mod: RT

## 2024-07-16 PROCEDURE — 96368 THER/DIAG CONCURRENT INF: CPT

## 2024-07-16 PROCEDURE — 96361 HYDRATE IV INFUSION ADD-ON: CPT

## 2024-07-16 PROCEDURE — 85025 COMPLETE CBC W/AUTO DIFF WBC: CPT

## 2024-07-16 PROCEDURE — 82947 ASSAY GLUCOSE BLOOD QUANT: CPT

## 2024-07-16 PROCEDURE — 96366 THER/PROPH/DIAG IV INF ADDON: CPT

## 2024-07-16 RX ORDER — OXYCODONE HYDROCHLORIDE 5 MG/1
10 TABLET ORAL EVERY 6 HOURS PRN
Status: DISCONTINUED | OUTPATIENT
Start: 2024-07-16 | End: 2024-07-19 | Stop reason: HOSPADM

## 2024-07-16 RX ORDER — VANCOMYCIN 2 G/400ML
2 INJECTION, SOLUTION INTRAVENOUS ONCE
Status: COMPLETED | OUTPATIENT
Start: 2024-07-16 | End: 2024-07-16

## 2024-07-16 RX ORDER — LISINOPRIL 5 MG/1
5 TABLET ORAL DAILY
Status: DISCONTINUED | OUTPATIENT
Start: 2024-07-17 | End: 2024-07-19 | Stop reason: HOSPADM

## 2024-07-16 RX ORDER — ACETAMINOPHEN 325 MG/1
650 TABLET ORAL EVERY 6 HOURS PRN
Status: DISCONTINUED | OUTPATIENT
Start: 2024-07-16 | End: 2024-07-19 | Stop reason: HOSPADM

## 2024-07-16 RX ORDER — CEFEPIME HYDROCHLORIDE 2 G/50ML
2 INJECTION, SOLUTION INTRAVENOUS ONCE
Status: COMPLETED | OUTPATIENT
Start: 2024-07-16 | End: 2024-07-16

## 2024-07-16 RX ORDER — OXYCODONE HYDROCHLORIDE 5 MG/1
5 TABLET ORAL EVERY 6 HOURS PRN
Status: DISCONTINUED | OUTPATIENT
Start: 2024-07-16 | End: 2024-07-19 | Stop reason: HOSPADM

## 2024-07-16 RX ORDER — DOCUSATE SODIUM 100 MG/1
100 CAPSULE, LIQUID FILLED ORAL 2 TIMES DAILY
Status: DISCONTINUED | OUTPATIENT
Start: 2024-07-16 | End: 2024-07-19 | Stop reason: HOSPADM

## 2024-07-16 RX ORDER — METRONIDAZOLE 500 MG/100ML
500 INJECTION, SOLUTION INTRAVENOUS ONCE
Status: COMPLETED | OUTPATIENT
Start: 2024-07-16 | End: 2024-07-16

## 2024-07-16 SDOH — SOCIAL STABILITY: SOCIAL INSECURITY: DO YOU FEEL ANYONE HAS EXPLOITED OR TAKEN ADVANTAGE OF YOU FINANCIALLY OR OF YOUR PERSONAL PROPERTY?: NO

## 2024-07-16 SDOH — SOCIAL STABILITY: SOCIAL INSECURITY: DOES ANYONE TRY TO KEEP YOU FROM HAVING/CONTACTING OTHER FRIENDS OR DOING THINGS OUTSIDE YOUR HOME?: NO

## 2024-07-16 SDOH — SOCIAL STABILITY: SOCIAL INSECURITY: ARE YOU OR HAVE YOU BEEN THREATENED OR ABUSED PHYSICALLY, EMOTIONALLY, OR SEXUALLY BY ANYONE?: NO

## 2024-07-16 SDOH — SOCIAL STABILITY: SOCIAL INSECURITY: ABUSE: ADULT

## 2024-07-16 SDOH — SOCIAL STABILITY: SOCIAL INSECURITY: ARE THERE ANY APPARENT SIGNS OF INJURIES/BEHAVIORS THAT COULD BE RELATED TO ABUSE/NEGLECT?: NO

## 2024-07-16 SDOH — SOCIAL STABILITY: SOCIAL INSECURITY: HAVE YOU HAD ANY THOUGHTS OF HARMING ANYONE ELSE?: NO

## 2024-07-16 SDOH — SOCIAL STABILITY: SOCIAL INSECURITY: HAVE YOU HAD THOUGHTS OF HARMING ANYONE ELSE?: NO

## 2024-07-16 SDOH — SOCIAL STABILITY: SOCIAL INSECURITY: DO YOU FEEL UNSAFE GOING BACK TO THE PLACE WHERE YOU ARE LIVING?: NO

## 2024-07-16 SDOH — SOCIAL STABILITY: SOCIAL INSECURITY: WERE YOU ABLE TO COMPLETE ALL THE BEHAVIORAL HEALTH SCREENINGS?: YES

## 2024-07-16 SDOH — SOCIAL STABILITY: SOCIAL INSECURITY: HAS ANYONE EVER THREATENED TO HURT YOUR FAMILY OR YOUR PETS?: NO

## 2024-07-16 ASSESSMENT — COGNITIVE AND FUNCTIONAL STATUS - GENERAL
PATIENT BASELINE BEDBOUND: NO
DAILY ACTIVITIY SCORE: 24
MOBILITY SCORE: 24
MOBILITY SCORE: 24
DAILY ACTIVITIY SCORE: 24

## 2024-07-16 ASSESSMENT — PAIN SCALES - GENERAL
PAINLEVEL_OUTOF10: 0 - NO PAIN

## 2024-07-16 ASSESSMENT — COLUMBIA-SUICIDE SEVERITY RATING SCALE - C-SSRS
2. HAVE YOU ACTUALLY HAD ANY THOUGHTS OF KILLING YOURSELF?: NO
1. IN THE PAST MONTH, HAVE YOU WISHED YOU WERE DEAD OR WISHED YOU COULD GO TO SLEEP AND NOT WAKE UP?: NO
6. HAVE YOU EVER DONE ANYTHING, STARTED TO DO ANYTHING, OR PREPARED TO DO ANYTHING TO END YOUR LIFE?: NO

## 2024-07-16 ASSESSMENT — ACTIVITIES OF DAILY LIVING (ADL)
ADEQUATE_TO_COMPLETE_ADL: YES
HEARING - RIGHT EAR: FUNCTIONAL
DRESSING YOURSELF: INDEPENDENT
LACK_OF_TRANSPORTATION: NO
GROOMING: INDEPENDENT
BATHING: INDEPENDENT
PATIENT'S MEMORY ADEQUATE TO SAFELY COMPLETE DAILY ACTIVITIES?: YES
JUDGMENT_ADEQUATE_SAFELY_COMPLETE_DAILY_ACTIVITIES: YES
TOILETING: INDEPENDENT
HEARING - LEFT EAR: FUNCTIONAL
ASSISTIVE_DEVICE: OTHER (COMMENT)
WALKS IN HOME: INDEPENDENT
FEEDING YOURSELF: INDEPENDENT

## 2024-07-16 ASSESSMENT — LIFESTYLE VARIABLES
SKIP TO QUESTIONS 9-10: 1
PRESCIPTION_ABUSE_PAST_12_MONTHS: NO
HOW MANY STANDARD DRINKS CONTAINING ALCOHOL DO YOU HAVE ON A TYPICAL DAY: PATIENT DOES NOT DRINK
HOW OFTEN DO YOU HAVE 6 OR MORE DRINKS ON ONE OCCASION: NEVER
AUDIT-C TOTAL SCORE: 0
SUBSTANCE_ABUSE_PAST_12_MONTHS: NO
AUDIT-C TOTAL SCORE: 0
HOW OFTEN DO YOU HAVE A DRINK CONTAINING ALCOHOL: NEVER

## 2024-07-16 ASSESSMENT — PAIN - FUNCTIONAL ASSESSMENT
PAIN_FUNCTIONAL_ASSESSMENT: 0-10
PAIN_FUNCTIONAL_ASSESSMENT: 0-10

## 2024-07-16 ASSESSMENT — PAIN DESCRIPTION - PAIN TYPE: TYPE: ACUTE PAIN

## 2024-07-16 ASSESSMENT — PATIENT HEALTH QUESTIONNAIRE - PHQ9
2. FEELING DOWN, DEPRESSED OR HOPELESS: NOT AT ALL
1. LITTLE INTEREST OR PLEASURE IN DOING THINGS: NOT AT ALL
SUM OF ALL RESPONSES TO PHQ9 QUESTIONS 1 & 2: 0

## 2024-07-16 ASSESSMENT — PAIN DESCRIPTION - ORIENTATION: ORIENTATION: LEFT

## 2024-07-16 ASSESSMENT — PAIN DESCRIPTION - LOCATION: LOCATION: FOOT

## 2024-07-16 NOTE — ED PROVIDER NOTES
HPI   Chief Complaint   Patient presents with    Skin Ulcer     Pt had a recent foot surgery and sent in by podiatry for infection        Patient is a 53-year-old male presents emergency department for evaluation of possible infection to right foot.  Patient states that he has had great difficulties over the last 4 months with his right foot.  He has had multiple surgeries to the right foot with most recent surgery being on 7/10 to do a revision flap given concern for a nonhealing ulcer on the right foot.  He follows closely with podiatry Dr. Savage who sent him in today given concern for underlying infection as they did drain possible abscess in the foot and concerned for underlying osteomyelitis at this time.  Patient feels relatively well with no complaints other than pain in his right foot.  He denies any recent fevers, chills, lightheadedness, dizziness, nausea, vomiting.  He is a diabetic and has history of atrial fibrillation on Eliquis.      History provided by:  Patient   used: No            Patient History   Past Medical History:   Diagnosis Date    Anxiety     Asthma ([facility])     Diabetes mellitus (Multi)     Encounter for other preprocedural examination 07/02/2017    Pre-operative clearance    Irregular heart beat     ST elevation (STEMI) myocardial infarction involving other coronary artery of anterior wall (Multi)     Anterior myocardial infarction    Unspecified open wound, unspecified foot, initial encounter 06/20/2017    Open wound of foot     Past Surgical History:   Procedure Laterality Date    FOOT SURGERY       Family History   Problem Relation Name Age of Onset    Diabetes Mother      Glaucoma Father      Hypertension Father      Diabetes Father       Social History     Tobacco Use    Smoking status: Never    Smokeless tobacco: Never   Vaping Use    Vaping status: Never Used   Substance Use Topics    Alcohol use: Yes     Alcohol/week: 2.0 standard drinks of alcohol      Types: 2 Glasses of wine per week     Comment: occassionally    Drug use: Never       Physical Exam   ED Triage Vitals [07/16/24 1209]   Temperature Heart Rate Respirations BP   36.8 °C (98.2 °F) 71 18 102/69      Pulse Ox Temp Source Heart Rate Source Patient Position   100 % Oral Monitor --      BP Location FiO2 (%)     -- --       Physical Exam  Constitutional:       Appearance: Normal appearance.   Cardiovascular:      Rate and Rhythm: Normal rate and regular rhythm.   Pulmonary:      Effort: Pulmonary effort is normal.      Breath sounds: Normal breath sounds.   Musculoskeletal:         General: Tenderness present. Normal range of motion.   Skin:     General: Skin is warm and dry.      Comments: Right foot with erythema, swelling, and ulcer to right medial foot and bottom of right foot.   Neurological:      General: No focal deficit present.      Mental Status: He is alert and oriented to person, place, and time.           ED Course & MDM   ED Course as of 07/16/24 1700 Tue Jul 16, 2024   1346 Spoke with podiatry Dr. Figueroa with Dr. Savage's office.  Patient had abscess drained today in office and sent here for IV antibiotics and admission for likely debridement tomorrow in the OR.  Recommends admission to medicine and consult to podiatry. [AF]   1597 Spoke with hospitalist Dr. Rucker.  States that patient is to be admitted to the podiatry service given this is a postsurgical complication and he will consult from medicine for patient.  Declines admitting patient primarily as he feels podiatry needs to admit primarily. [AF]   0667 Spoke once again with Dr. Figueroa with podiatry and will accept patient primarily given refusal from hospitalist. [AF]      ED Course User Index  [AF] Yamilet Khan PA-C         Diagnoses as of 07/16/24 1700   Foot infection                       [place] Coma Scale Score: 15                        Medical Decision Making  Patient is a 53-year-old male presents emergency department  for evaluation of possible right foot infection sent in by podiatry.    Lab work done today included CMP, CBC, lactic acid, collection of blood cultures.  Lab work shows elevation of ESR and CRP with anemia and otherwise unremarkable.    Scans done today were interpreted/confirmed by radiologist and also interpreted by me which included x-ray right foot.  X-ray shows no obvious plain film evidence to indicate osteomyelitis with extensive arterial calcifications with osteotomy of the distal tibia and distal fibula with intramedullary johan extending from the distal tibia into the calcaneus with some radiolucency about the intramedullary johan which may indicate loosening of the johan with johan extending into deep subcutaneous tissue along the plantar aspect of the foot without interval change.    Medications given at today's visit include IV fluids, IV cefepime, IV vancomycin, IV metronidazole    I saw this patient in conjunction with Dr. Carrera.  Remained stable in the emergency department.  Patient's lab work today does have elevation of ESR and CRP.  X-ray does not show any plain film evidence for osteomyelitis at this time.  Did speak with patient's podiatrist Dr. Figueroa who works with Dr. Savage for patient care moving forward.  They state that they just recently performed a flap excision for chronic ulcer and today saw him in office and were concerned about an abscess.  They drained this abscess around the site of the surgery and sent him here for admission for IV antibiotics and likely debridement in the OR.  They asked that patient be admitted to medical service given his multiple comorbidities.  I then spoke with the hospitalist on-call Dr. Rucker declined admission at this time as he feels that podiatry needs to admit patient primarily as this is a postsurgical complication from their initial procedure on 7/10.  Then once again called podiatry Dr. Figueroa who said that given that hospitalist declined admission  they will admit primarily at this time and consult medicine.  Patient admitted for further care.    The patient/family was counseled on clinical impression, expectations, and plan along with recommendations to admission.  All questions were answered and involved parties were understanding and agreeable to course of treatment.  Case was discussed with admitting physician and any consultants. Bed type, ED treatment and further ED workup decided by joint decision making with admitting team and any consultants. Patient stable for admission per my assessment and further management of patient will be deferred to the inpatient setting.    ** Disclaimer:  Parts of this document were written utilizing a voice to text dictation software.  Note may contain minor transcription or typographical errors that were inadvertently transcribed by the computer software.        Procedure  Procedures     Yamilet Khan PA-C  07/16/24 0874

## 2024-07-16 NOTE — H&P
PODIATRY HISTORY AND PHYSICAL    Subjective   HPI:  Patient is a 53-year-old male with PMH of A-fib on Eliquis CAD with h/o STEMI, DM2, morbid obesity, lymphedema, Charcot ankle who presented to [facility] emergency department for evaluation of possible infection to right foot. Patient has had multiple surgeries to the right foot including charcot reconstruction in with talectomy, tibiocalcaneal arthrodesis, with application of multiplanar ex fix RLE(6/2023), removal of ex fix with debridement of soft tissue and bone RLE (11/2023), wound debridement RLE and excision of lower extremity lesion (1/2024), and most recently excision of ulceration with advancement flap closure of chronic ulceration last week (7/10/24). He follows closely with podiatry Dr. Savage who sent him in today given concern for underlying infection as they did drain possible abscess in the foot. Patient feels relatively well with no complaints other than new pain in his right foot. He denies any recent fevers, chills, lightheadedness, dizziness, nausea, vomiting.     ROS: 10-point review of systems was performed and is otherwise negative except as noted in HPI.  PMH: Reviewed/documented below.  PSH:  Noncontributory except per HPI   FH: Reviewed and noncontributory   SOCIAL:  Reviewed/documented below.  ALLERGIES: Reviewed/documented below.  MEDS: Reviewed/documented below.  VS: Reviewed/documented below.    Past Medical History:   Diagnosis Date    Anxiety     Asthma ([facility])     Diabetes mellitus (Multi)     Encounter for other preprocedural examination 07/02/2017    Pre-operative clearance    Irregular heart beat     ST elevation (STEMI) myocardial infarction involving other coronary artery of anterior wall (Multi)     Anterior myocardial infarction    Unspecified open wound, unspecified foot, initial encounter 06/20/2017    Open wound of foot     Past Surgical History:   Procedure Laterality Date    FOOT SURGERY       Family History   Problem  Relation Name Age of Onset    Diabetes Mother      Glaucoma Father      Hypertension Father      Diabetes Father       Social History     Tobacco Use    Smoking status: Never    Smokeless tobacco: Never   Vaping Use    Vaping status: Never Used   Substance Use Topics    Alcohol use: Yes     Alcohol/week: 2.0 standard drinks of alcohol     Types: 2 Glasses of wine per week     Comment: occassionally    Drug use: Never      Medications Prior to Admission   Medication Sig Dispense Refill Last Dose    apixaban (Eliquis) 5 mg tablet Take 1 tablet (5 mg) by mouth 2 times a day.       docusate sodium (Colace) 100 mg capsule Take 1 capsule (100 mg) by mouth 2 times a day as needed.       lisinopril 5 mg tablet Take 1 tablet (5 mg) by mouth once daily.       metFORMIN, OSM, (Fortamet) 500 mg 24 hr tablet Take 1 tablet (500 mg) by mouth 2 times daily (morning and late afternoon). Do not crush, chew, or split.       [] oxyCODONE-acetaminophen (Percocet) 5-325 mg tablet Take 1 tablet by mouth every 6 hours if needed for severe pain (7 - 10) for up to 3 days. 12 tablet 0         Medications:  Scheduled Meds:   Continuous Infusions:   PRN Meds:     Allergies as of 2024 - Reviewed 2024   Allergen Reaction Noted    Penicillins Unknown 06/15/2023    Shellfish containing products Unknown 06/15/2023            Objective   PHYSICAL EXAM:  Physical Exam Performed:  Vitals:    24 1904   BP: 114/66   Pulse: 79   Resp: 17   Temp: 35.9 °C (96.6 °F)   SpO2: 97%     Body mass index is 52.42 kg/m².    Patient is AOx3 and in no acute distress. Patient is alert and cooperative. Sitting comfortably in bed with dressing clean, dry and intact. Heel off-loading boots in place B/L.    Vascular: Faintly palpable DP/PT pulses B/L. Severe pitting edema noted B/L. Hair growth absent B/L. CFT<5 to B/L hallux. Temperature is warm to cool from tibial tuberosity to distal digits B/L. No lymphatic streaking noted  B/L.    Musculoskeletal: Gross active and passive ROM intact to age and activity level except at right ankle. Moves all extremities spontaneously. Pain to palpation right medial heel    Neurological: Intact light touch sensation B/L. Pain stimuli diminished B/L. Denies any numbness, burning or tingling.    Dermatologic: Nails 1-5 are within normal limits for thickness and length B/L. Skin appears taut but well hydrated B/L. Web spaces 1-4 B/L are clean, dry and intact. No rashes or nodules noted B/L. No hyperkeratotic tissue noted B/L.  Incision to dorsomedial right foot with skin edges well-coapted with sutures intact. 2 cm raised fluctuant skin noted at right heel medially. Erythema at this site which does not extend proximally,focal calor noted,minimal malodor.    LABS:   Results for orders placed or performed during the hospital encounter of 07/16/24 (from the past 24 hour(s))   BLOOD GAS LACTIC ACID, VENOUS   Result Value Ref Range    POCT Lactate, Venous 0.9 0.4 - 2.0 mmol/L   CBC and Auto Differential   Result Value Ref Range    WBC 5.5 4.4 - 11.3 x10*3/uL    nRBC 0.0 0.0 - 0.0 /100 WBCs    RBC 4.71 4.50 - 5.90 x10*6/uL    Hemoglobin 12.8 (L) 13.5 - 17.5 g/dL    Hematocrit 40.4 (L) 41.0 - 52.0 %    MCV 86 80 - 100 fL    MCH 27.2 26.0 - 34.0 pg    MCHC 31.7 (L) 32.0 - 36.0 g/dL    RDW 13.2 11.5 - 14.5 %    Platelets 250 150 - 450 x10*3/uL    Neutrophils % 53.9 40.0 - 80.0 %    Immature Granulocytes %, Automated 0.2 0.0 - 0.9 %    Lymphocytes % 35.4 13.0 - 44.0 %    Monocytes % 6.7 2.0 - 10.0 %    Eosinophils % 3.1 0.0 - 6.0 %    Basophils % 0.7 0.0 - 2.0 %    Neutrophils Absolute 2.98 1.20 - 7.70 x10*3/uL    Immature Granulocytes Absolute, Automated 0.01 0.00 - 0.70 x10*3/uL    Lymphocytes Absolute 1.96 1.20 - 4.80 x10*3/uL    Monocytes Absolute 0.37 0.10 - 1.00 x10*3/uL    Eosinophils Absolute 0.17 0.00 - 0.70 x10*3/uL    Basophils Absolute 0.04 0.00 - 0.10 x10*3/uL   Comprehensive Metabolic Panel   Result  Value Ref Range    Glucose 136 (H) 65 - 99 mg/dL    Sodium 134 133 - 145 mmol/L    Potassium 4.9 3.4 - 5.1 mmol/L    Chloride 100 97 - 107 mmol/L    Bicarbonate 26 24 - 31 mmol/L    Urea Nitrogen 18 8 - 25 mg/dL    Creatinine 1.10 0.40 - 1.60 mg/dL    eGFR 80 >60 mL/min/1.73m*2    Calcium 8.8 8.5 - 10.4 mg/dL    Albumin 3.3 (L) 3.5 - 5.0 g/dL    Alkaline Phosphatase 122 35 - 125 U/L    Total Protein 8.0 (H) 5.9 - 7.9 g/dL    AST 19 5 - 40 U/L    Bilirubin, Total 0.3 0.1 - 1.2 mg/dL    ALT 11 5 - 40 U/L    Anion Gap 8 <=19 mmol/L   Sedimentation rate, automated   Result Value Ref Range    Sedimentation Rate 97 (H) 0 - 20 mm/h   C-reactive protein   Result Value Ref Range    C-Reactive Protein 3.00 (H) 0.00 - 2.00 mg/dL      Lab Results   Component Value Date    HGBA1C 6.7 (H) 09/08/2023      Lab Results   Component Value Date    CRP 3.00 (H) 07/16/2024      Lab Results   Component Value Date    SEDRATE 97 (H) 07/16/2024        Results from last 7 days   Lab Units 07/16/24  1226   WBC AUTO x10*3/uL 5.5   RBC AUTO x10*6/uL 4.71   HEMOGLOBIN g/dL 12.8*   HEMATOCRIT % 40.4*     Results from last 7 days   Lab Units 07/16/24  1226   SODIUM mmol/L 134   POTASSIUM mmol/L 4.9   CHLORIDE mmol/L 100   CO2 mmol/L 26   BUN mg/dL 18   CREATININE mg/dL 1.10   CALCIUM mg/dL 8.8   BILIRUBIN TOTAL mg/dL 0.3   ALT U/L 11   AST U/L 19           IMAGING REVIEW:  XR foot right 3+ views    Result Date: 7/16/2024  Interpreted By:  Cheli Rondon, STUDY: XR FOOT RIGHT 3+ VIEWS 7/16/2024 12:39 pm   INDICATION: Ulceration and pain   COMPARISON: 09/09/2023   ACCESSION NUMBER(S): [id number]   ORDERING CLINICIAN: NARCISO ALLEN   TECHNIQUE: Three views of right foot   FINDINGS: Intramedullary johan extends from the distal tibia and into the calcaneus with intramedullary johan extending into the deep plantar soft tissues. This is unchanged. There is lucency identified about the intramedullary johan which may indicate loosening of the johan. There is  extensive arterial calcification. There has been osteotomy of the distal tibia and distal fibula sclerosis and osteophytosis of the distal tibia with considerable sclerosis and osteophytosis of the distal articular surface of the tibia. Soft tissue swelling of the foot is present. There is cortical thickening of the proximal to mid 5th metatarsal laterally.   No obvious plain film evidence for osteomyelitis is demonstrated.       No obvious plain film evidence to indicate osteomyelitis.   Extensive arterial calcification is observed consistent with diabetes.   Osteotomy of the distal tibia and distal fibula with intramedullary johan extending from the distal tibia into the calcaneus. There is some radiolucency about the intramedullary johan which may indicate loosening of the johan.   The johan extends into the deep subcutaneous tissues along the plantar aspect of the foot without interval change.   Signed by: Cheli Rondon 7/16/2024 1:27 PM Dictation workstation:   [id number]    EKG 12 lead    Result Date: 7/15/2024  Normal sinus rhythm Right bundle branch block Abnormal ECG When compared with ECG of 10-MONIQUE-2024 11:32, No significant change was found Confirmed by Jake Corrigan ([id number]) on 7/15/2024 8:42:29 AM       Assessment/Plan   Assessment:    Patient is a 53-year-old male with PMH of A-fib on Eliquis CAD with h/o STEMI, DM2, morbid obesity, lymphedema, Charcot ankle who presented to [facility] emergency department for evaluation of possible infection to right foot. Patient has had multiple surgeries to the right foot including charcot reconstruction and several wound debridements, as well as excision of ulceration with advancement flap closure of chronic ulceration last week (7/10/24).     #Cellulitis with abscess right foot  #Lymphedema  #T2DM with neuropathy  #S/p charcot recon (6/2023)  #Morbid obesity    Plan:    - Patient was seen and evaluated; all findings were discussed and all questions were answered to patient's  satisfaction.  - Charts, labs, vitals and imaging all reviewed.   - Imaging: X-rays no indication of OM. MRI pending result, ordered by ED.  - Labs: See above. ESR 97, CRP 3, WBC 5.5.  - Wound culture: To be collected intra-op tomorrow. Most recent MSSA 1 week ago.   - Blood culture: Pending. NTD.  - Consult hospitalist team for medication management while in-house.    Plan:  - Abx: IV vanc/cefepime  - Pain Regimen: Tylenol for mild pain, Poornima for moderate to severe.  - Plan for incision and drainage of abscess tomorrow right foot. Discussed this in detail with patient as well as risks and benefits   - Surgery scheduled 7/17/2024. NPO aftermidnight.  - Dressings: betadine-soaked adaptic, 4x4 gauze, ABD, kerlix, ACE.  - Nursing staff is able to change/reinforce dressing if & as necessary until next day’s dressing change. Thank you.    Other chronic conditions: Will continue home meds for now. Hospitalist team consulted for inpatient medical management.    DVT ppx: Hold eliquis for tonight, will restart after surgery.  Weightbearing: NWB for now, partial WB to heel for transfers.  Discharge: Pt to follow up 1 week after discharge with Dr. Savage.    Case to be discussed with attending, A&P above reflects a tentative plan. Please await for the final signature from the attending physician on service Dr. Figueroa.    Darwin Mejias DPM/PGY-3  Podiatric Medicine & Surgery  [place]            SIGNATURE: Darwin Mejias DPM PATIENT NAME: Lazarus Truong   DATE: July 16, 2024 MRN: [id number]   TIME: 8:04 PM CONTACT: Haiku message

## 2024-07-16 NOTE — PROGRESS NOTES
Attestation/Supervisory note for MARY Khan      The patient is a 53-year-old male presenting to the emergency department by EMS from home, reportedly at the direction of his podiatrist, Dr. Savage, to be admitted to the hospital for possible osteomyelitis.  The patient reports that he does have chronic foot issues.  He states that he had to had some hardware replaced and had surgery on his right foot 7 days ago by Dr. Savage.  He states he has had worsening pain, swelling and discharge from his right foot since then.  He is not taking any antibiotics at this time.  He is prediabetic.  He denies any headache or visual changes.  No chest pain or shortness of breath.  No abdominal pain.  No nausea vomiting.  No diarrhea or constipation.  No urinary complaints.  No fever or chills.  No focal weakness or numbness.  All pertinent positives and negatives are recorded above.  All other systems reviewed and otherwise negative.  Vital signs within normal limits.  Physical exam with a well-nourished well-developed male with morbid obesity but in no acute distress.  HEENT exam with dry mucous membranes but otherwise unremarkable.  He has no evidence of airway compromise or respiratory distress.  Abdominal exam is benign.  He does not have any gross motor, neurologic or vascular deficits on exam does have edema with erythema and a wound to the right posterior heel and plantar surface.      Cultures were obtained and IV fluids and IV antibiotics were ordered.      Diagnostic labs with elevated ESR, borderline anemia, elevated CRP otherwise unremarkable.      Initial lactic acid of 0.9.  Repeat lactic pending at the time of admission      XR foot right 3+ views   Final Result   No obvious plain film evidence to indicate osteomyelitis.        Extensive arterial calcification is observed consistent with diabetes.        Osteotomy of the distal tibia and distal fibula with intramedullary   johan extending from the distal tibia into the  calcaneus. There is some   radiolucency about the intramedullary johan which may indicate   loosening of the johan.        The johan extends into the deep subcutaneous tissues along the plantar   aspect of the foot without interval change.        Signed by: Cheli Rondon 7/16/2024 1:27 PM   Dictation workstation:   [id number]      MR ankle right wo IV contrast    (Results Pending)        The patient does not have any evidence of osteomyelitis on diagnostic imaging.  He does not have any significant lab abnormalities other than an elevated CRP and ESR.  He does not have any evidence of hemodynamic instability.  The case was discussed with his referring provider, Dr. Savage, and he requested admission by the medicine service.  Dr. Rucker, the hospitalist, agreed to consult on the patient but he deferred the admission to the podiatrist.      Impression/diagnosis:  1.  Postoperative right foot infection/cellulitis      Critical care time billing is not warranted at this time      I personally saw the patient and made/approve the management plan and take responsibility for the patient management.      I independently interpreted the following study (S) diagnostic labs      I personally discussed the patient's management with the patient      I reviewed the results of the diagnostic labs and diagnostic imaging.  Formal radiology read was completed by the radiologist.      Isidra Carrera MD

## 2024-07-16 NOTE — CARE PLAN
The patient's goals for the shift include      The clinical goals for the shift include IV antibiotic, NPO after midnight      Problem: Pain - Adult  Goal: Verbalizes/displays adequate comfort level or baseline comfort level  Outcome: Progressing     Problem: Safety - Adult  Goal: Free from fall injury  Outcome: Progressing     Problem: Discharge Planning  Goal: Discharge to home or other facility with appropriate resources  Outcome: Progressing     Problem: Chronic Conditions and Co-morbidities  Goal: Patient's chronic conditions and co-morbidity symptoms are monitored and maintained or improved  Outcome: Progressing

## 2024-07-17 ENCOUNTER — ANESTHESIA EVENT (OUTPATIENT)
Dept: OPERATING ROOM | Facility: HOSPITAL | Age: 54
End: 2024-07-17
Payer: MEDICAID

## 2024-07-17 ENCOUNTER — ANESTHESIA (OUTPATIENT)
Dept: OPERATING ROOM | Facility: HOSPITAL | Age: 54
End: 2024-07-17
Payer: MEDICAID

## 2024-07-17 PROBLEM — I89.0 LYMPHEDEMA: Status: ACTIVE | Noted: 2024-07-16

## 2024-07-17 PROBLEM — L03.115 CELLULITIS OF RIGHT FOOT: Status: ACTIVE | Noted: 2024-07-16

## 2024-07-17 PROBLEM — L02.611 FOOT ABSCESS, RIGHT: Status: ACTIVE | Noted: 2024-07-16

## 2024-07-17 LAB
ANION GAP SERPL CALC-SCNC: 8 MMOL/L
BUN SERPL-MCNC: 17 MG/DL (ref 8–25)
CALCIUM SERPL-MCNC: 8.3 MG/DL (ref 8.5–10.4)
CHLORIDE SERPL-SCNC: 103 MMOL/L (ref 97–107)
CO2 SERPL-SCNC: 23 MMOL/L (ref 24–31)
CREAT SERPL-MCNC: 1.1 MG/DL (ref 0.4–1.6)
EGFRCR SERPLBLD CKD-EPI 2021: 80 ML/MIN/1.73M*2
ERYTHROCYTE [DISTWIDTH] IN BLOOD BY AUTOMATED COUNT: 13.2 % (ref 11.5–14.5)
EST. AVERAGE GLUCOSE BLD GHB EST-MCNC: 157 MG/DL
GLUCOSE BLD MANUAL STRIP-MCNC: 103 MG/DL (ref 74–99)
GLUCOSE BLD MANUAL STRIP-MCNC: 159 MG/DL (ref 74–99)
GLUCOSE BLD MANUAL STRIP-MCNC: 90 MG/DL (ref 74–99)
GLUCOSE SERPL-MCNC: 151 MG/DL (ref 65–99)
HBA1C MFR BLD: 7.1 %
HCT VFR BLD AUTO: 36.1 % (ref 41–52)
HGB BLD-MCNC: 11.2 G/DL (ref 13.5–17.5)
MCH RBC QN AUTO: 26.7 PG (ref 26–34)
MCHC RBC AUTO-ENTMCNC: 31 G/DL (ref 32–36)
MCV RBC AUTO: 86 FL (ref 80–100)
NRBC BLD-RTO: 0 /100 WBCS (ref 0–0)
PLATELET # BLD AUTO: 215 X10*3/UL (ref 150–450)
POTASSIUM SERPL-SCNC: 4.6 MMOL/L (ref 3.4–5.1)
RBC # BLD AUTO: 4.19 X10*6/UL (ref 4.5–5.9)
SODIUM SERPL-SCNC: 134 MMOL/L (ref 133–145)
WBC # BLD AUTO: 5.5 X10*3/UL (ref 4.4–11.3)

## 2024-07-17 PROCEDURE — 1100000001 HC PRIVATE ROOM DAILY

## 2024-07-17 PROCEDURE — 87205 SMEAR GRAM STAIN: CPT | Mod: TRILAB,WESLAB | Performed by: PODIATRIST

## 2024-07-17 PROCEDURE — 2500000004 HC RX 250 GENERAL PHARMACY W/ HCPCS (ALT 636 FOR OP/ED): Mod: JZ | Performed by: PODIATRIST

## 2024-07-17 PROCEDURE — 87070 CULTURE OTHR SPECIMN AEROBIC: CPT | Mod: TRILAB,WESLAB | Performed by: PODIATRIST

## 2024-07-17 PROCEDURE — 36415 COLL VENOUS BLD VENIPUNCTURE: CPT

## 2024-07-17 PROCEDURE — 3600000008 HC OR TIME - EACH INCREMENTAL 1 MINUTE - PROCEDURE LEVEL THREE: Performed by: PODIATRIST

## 2024-07-17 PROCEDURE — 7100000002 HC RECOVERY ROOM TIME - EACH INCREMENTAL 1 MINUTE: Performed by: PODIATRIST

## 2024-07-17 PROCEDURE — 88307 TISSUE EXAM BY PATHOLOGIST: CPT | Mod: TC | Performed by: PODIATRIST

## 2024-07-17 PROCEDURE — 82374 ASSAY BLOOD CARBON DIOXIDE: CPT

## 2024-07-17 PROCEDURE — 2500000004 HC RX 250 GENERAL PHARMACY W/ HCPCS (ALT 636 FOR OP/ED): Performed by: NURSE PRACTITIONER

## 2024-07-17 PROCEDURE — 7100000001 HC RECOVERY ROOM TIME - INITIAL BASE CHARGE: Performed by: PODIATRIST

## 2024-07-17 PROCEDURE — 2500000004 HC RX 250 GENERAL PHARMACY W/ HCPCS (ALT 636 FOR OP/ED): Performed by: ANESTHESIOLOGIST ASSISTANT

## 2024-07-17 PROCEDURE — 85027 COMPLETE CBC AUTOMATED: CPT

## 2024-07-17 PROCEDURE — 2500000004 HC RX 250 GENERAL PHARMACY W/ HCPCS (ALT 636 FOR OP/ED): Mod: JZ

## 2024-07-17 PROCEDURE — 2720000007 HC OR 272 NO HCPCS: Performed by: PODIATRIST

## 2024-07-17 PROCEDURE — 3600000003 HC OR TIME - INITIAL BASE CHARGE - PROCEDURE LEVEL THREE: Performed by: PODIATRIST

## 2024-07-17 PROCEDURE — 3700000002 HC GENERAL ANESTHESIA TIME - EACH INCREMENTAL 1 MINUTE: Performed by: PODIATRIST

## 2024-07-17 PROCEDURE — 82947 ASSAY GLUCOSE BLOOD QUANT: CPT

## 2024-07-17 PROCEDURE — 2500000002 HC RX 250 W HCPCS SELF ADMINISTERED DRUGS (ALT 637 FOR MEDICARE OP, ALT 636 FOR OP/ED): Performed by: NURSE PRACTITIONER

## 2024-07-17 PROCEDURE — A28005 PR DEEP INCIS FOOT BONE INFECTN: Performed by: ANESTHESIOLOGY

## 2024-07-17 PROCEDURE — 2500000004 HC RX 250 GENERAL PHARMACY W/ HCPCS (ALT 636 FOR OP/ED): Performed by: PODIATRIST

## 2024-07-17 PROCEDURE — 2500000001 HC RX 250 WO HCPCS SELF ADMINISTERED DRUGS (ALT 637 FOR MEDICARE OP): Performed by: NURSE PRACTITIONER

## 2024-07-17 PROCEDURE — 3700000001 HC GENERAL ANESTHESIA TIME - INITIAL BASE CHARGE: Performed by: PODIATRIST

## 2024-07-17 PROCEDURE — 83036 HEMOGLOBIN GLYCOSYLATED A1C: CPT | Performed by: NURSE PRACTITIONER

## 2024-07-17 PROCEDURE — 0QBL3ZX EXCISION OF RIGHT TARSAL, PERCUTANEOUS APPROACH, DIAGNOSTIC: ICD-10-PCS | Performed by: PODIATRIST

## 2024-07-17 PROCEDURE — A28005 PR DEEP INCIS FOOT BONE INFECTN: Performed by: ANESTHESIOLOGIST ASSISTANT

## 2024-07-17 RX ORDER — HYDRALAZINE HYDROCHLORIDE 20 MG/ML
5 INJECTION INTRAMUSCULAR; INTRAVENOUS EVERY 30 MIN PRN
Status: DISCONTINUED | OUTPATIENT
Start: 2024-07-17 | End: 2024-07-17 | Stop reason: HOSPADM

## 2024-07-17 RX ORDER — SODIUM CHLORIDE, SODIUM LACTATE, POTASSIUM CHLORIDE, CALCIUM CHLORIDE 600; 310; 30; 20 MG/100ML; MG/100ML; MG/100ML; MG/100ML
INJECTION, SOLUTION INTRAVENOUS CONTINUOUS PRN
Status: DISCONTINUED | OUTPATIENT
Start: 2024-07-17 | End: 2024-07-17

## 2024-07-17 RX ORDER — CLINDAMYCIN PHOSPHATE 900 MG/50ML
900 INJECTION, SOLUTION INTRAVENOUS ONCE
Status: COMPLETED | OUTPATIENT
Start: 2024-07-17 | End: 2024-07-17

## 2024-07-17 RX ORDER — HYDROXYZINE PAMOATE 25 MG/1
25 CAPSULE ORAL 4 TIMES DAILY PRN
Status: DISCONTINUED | OUTPATIENT
Start: 2024-07-17 | End: 2024-07-19 | Stop reason: HOSPADM

## 2024-07-17 RX ORDER — VANCOMYCIN HYDROCHLORIDE 1 G/20ML
INJECTION, POWDER, LYOPHILIZED, FOR SOLUTION INTRAVENOUS DAILY PRN
Status: DISCONTINUED | OUTPATIENT
Start: 2024-07-17 | End: 2024-07-19 | Stop reason: HOSPADM

## 2024-07-17 RX ORDER — DIPHENHYDRAMINE HYDROCHLORIDE 50 MG/ML
25 INJECTION INTRAMUSCULAR; INTRAVENOUS ONCE
Status: COMPLETED | OUTPATIENT
Start: 2024-07-17 | End: 2024-07-17

## 2024-07-17 RX ORDER — FENTANYL CITRATE 50 UG/ML
INJECTION, SOLUTION INTRAMUSCULAR; INTRAVENOUS AS NEEDED
Status: DISCONTINUED | OUTPATIENT
Start: 2024-07-17 | End: 2024-07-17

## 2024-07-17 RX ORDER — VANCOMYCIN 1 G/200ML
1 INJECTION, SOLUTION INTRAVENOUS EVERY 12 HOURS
Status: DISCONTINUED | OUTPATIENT
Start: 2024-07-17 | End: 2024-07-19 | Stop reason: HOSPADM

## 2024-07-17 RX ORDER — LABETALOL HYDROCHLORIDE 5 MG/ML
5 INJECTION, SOLUTION INTRAVENOUS ONCE AS NEEDED
Status: DISCONTINUED | OUTPATIENT
Start: 2024-07-17 | End: 2024-07-17 | Stop reason: HOSPADM

## 2024-07-17 RX ORDER — BUPIVACAINE HYDROCHLORIDE 5 MG/ML
INJECTION, SOLUTION PERINEURAL AS NEEDED
Status: DISCONTINUED | OUTPATIENT
Start: 2024-07-17 | End: 2024-07-17 | Stop reason: HOSPADM

## 2024-07-17 RX ORDER — ONDANSETRON HYDROCHLORIDE 2 MG/ML
4 INJECTION, SOLUTION INTRAVENOUS ONCE AS NEEDED
Status: DISCONTINUED | OUTPATIENT
Start: 2024-07-17 | End: 2024-07-17 | Stop reason: HOSPADM

## 2024-07-17 RX ORDER — MEPERIDINE HYDROCHLORIDE 25 MG/ML
12.5 INJECTION INTRAMUSCULAR; INTRAVENOUS; SUBCUTANEOUS EVERY 10 MIN PRN
Status: DISCONTINUED | OUTPATIENT
Start: 2024-07-17 | End: 2024-07-17 | Stop reason: HOSPADM

## 2024-07-17 RX ORDER — CEFEPIME HYDROCHLORIDE 2 G/50ML
2 INJECTION, SOLUTION INTRAVENOUS EVERY 12 HOURS
Status: DISCONTINUED | OUTPATIENT
Start: 2024-07-17 | End: 2024-07-19 | Stop reason: HOSPADM

## 2024-07-17 RX ORDER — MIDAZOLAM HYDROCHLORIDE 1 MG/ML
INJECTION, SOLUTION INTRAMUSCULAR; INTRAVENOUS AS NEEDED
Status: DISCONTINUED | OUTPATIENT
Start: 2024-07-17 | End: 2024-07-17

## 2024-07-17 RX ORDER — KETAMINE HYDROCHLORIDE 50 MG/ML
INJECTION, SOLUTION INTRAMUSCULAR; INTRAVENOUS AS NEEDED
Status: DISCONTINUED | OUTPATIENT
Start: 2024-07-17 | End: 2024-07-17

## 2024-07-17 SDOH — HEALTH STABILITY: MENTAL HEALTH: CURRENT SMOKER: 0

## 2024-07-17 ASSESSMENT — COGNITIVE AND FUNCTIONAL STATUS - GENERAL
MOBILITY SCORE: 24
DAILY ACTIVITIY SCORE: 24
MOBILITY SCORE: 24
DAILY ACTIVITIY SCORE: 24

## 2024-07-17 ASSESSMENT — PAIN SCALES - GENERAL
PAINLEVEL_OUTOF10: 0 - NO PAIN
PAINLEVEL_OUTOF10: 0 - NO PAIN
PAIN_LEVEL: 0
PAINLEVEL_OUTOF10: 0 - NO PAIN

## 2024-07-17 ASSESSMENT — PAIN - FUNCTIONAL ASSESSMENT
PAIN_FUNCTIONAL_ASSESSMENT: 0-10

## 2024-07-17 NOTE — OP NOTE
PODIATRIC OPERATIVE REPORT    LOG ID: [id number]  SURGERY/PROCEDURE DATE: 7/16/2024 - 7/17/2024  OR LOCATION: [facility]    SURGEON(S)/PROCEDURALIST(S) AND ASSISTANT(S):  Primary: David Figueroa DPM    OTHER OR STAFF:  Circulator: Kody Garsia RN  Scrub Person: Juanjo Fisher    SURGERY/PROCEDURE(S):  (1) Incision to bone cortex right foot (CPT 10730)  (2) Bone biopsy right lower extremity (CPT 20054)    WI INCISION & DRAINAGE LEG/ANKLE ABSCESS/HEMATOMA [16691]  PRE-OP/PRE-PROCEDURE DIAGNOSIS:   Pre-op Diagnosis   (1) Lymphedema [I89.0]  (2) Foot abscess, right [L02.611]  (3) Cellulitis of right foot [L03.115]    POST-OP/POST-PROCEDURE DIAGNOSIS: Same as Pre-Op    ANESTHESIA:   Anesthesia: Monitor Anesthesia Care  ASA: III  Anesthesia Staff: Anesthesiologist: Jaleel Vegas MD  C-AA: SUDHIR Colunga  Intra-op Medications: Administrations occurring from 1245 to 1330 on 07/17/24:  * No intraprocedure medications in log *    ESTIMATED BLOOD LOSS: 10 ml    SPECIMENS:   Order Name Source Comment Collection Info Order Time   TISSUE/WOUND CULTURE/SMEAR BONE BIOPSY (DECAL) Pre-op diagnosis:  Lymphedema [I89.0]  Foot abscess, right [L02.611]  Cellulitis of right foot [L03.115] Collected By: David Figueroa DPM 7/17/2024  3:06 PM     Release result to [facility]   Immediate        TISSUE/WOUND CULTURE/SMEAR BONE BIOPSY (DECAL) Pre-op diagnosis:  Lymphedema [I89.0]  Foot abscess, right [L02.611]  Cellulitis of right foot [L03.115] Collected By: David Figueroa DPM 7/17/2024  3:09 PM     Release result to MyChart   Immediate        SURGICAL PATHOLOGY EXAM BONE BIOPSY (DECAL) Pre-op diagnosis:  Lymphedema [I89.0]  Foot abscess, right [L02.611]  Cellulitis of right foot [L03.115] Collected By: David Figueroa DPM 7/17/2024  3:06 PM       IMPLANTABLE DEVICES:  Nothing was implanted during the procedure    FINDINGS: Intraoperative findings consistent with clinical and radiographic findings.    DRAINS: N/A    TOURNIQUET TIMES:        COMPLICATIONS: None; patient tolerated the procedure well.       SURGERY/PROCEDURE DETAILS:  INDICATIONS FOR PROCEDURE:   The patient with diagnosis as outlined above presents for podiatric surgical intervention today. The patient has attempted and failed conservative treatment as outlined in preoperative clinic notes and wishes to proceed with surgical intervention at this time. The nature of the deformity, problems anticipated procedures, recovery/convalescence, risks/complications including but not limited to numbness, CRPS, over/under correction, problems healing of soft tissue or bone, postoperative wound infection, wound dehiscence, DVT and/or persistent pain/disability have been explained to the patient in detail. An updated H&P and consent have been completed prior to today’s surgical intervention. The patient states that they have been NPO since midnight. No guarantees were given or implied, but it is expected that the patient will have a favorable outcome.  It is with this understanding that we proceed.     PRE-PROCEDURE INFORMATION:  In pre-op holding area, the lower extremity to be operated on was clearly marked and the patient verified correct laterality of the marking.  The patient was brought to the operating room and placed on the operating table in the supine position.  A timeout was performed in which identification of the correct patient, procedure, location, and materials was done. No tourniquet used this case. Following MAC sedation by anesthesia, local anesthesia was obtained utilizing 10 cc of 0.5% marcaine plain. The right foot was then scrubbed, prepped and draped in the usual aseptic manner.    DESCRIPTION OF PROCEDURE:     Procedure 1:     At this time attention was then directed to the right lower extremity where a raised fluctuant area of skin consistent with abscess was noted on the medial aspect of the right ankle. A 3 cm linear longitudinal incision made across the area. At this  "time 15 ml of serosanguinous drainage extruded from the incision site. A swab culture was taken at this time to be sent to microbiology to help guide antibiotic coverage. No purulence appreciated, no malodor. A probe was used to determine depth and any tracking. The area does track approximately 4-5 cm down to bone.    Procedure 2:    At this time decision to perform bone biopsy was made given MRI changes concerning for osteomyelitis and clinical correlation with intra-operative probe to bone. Jamshidi needle was assembled with the stylet in place, and the needle was inserted perpendicular to the bone into the outer cortex using a twisting motion. The stylet was removed once the needle has passed through the outer cortex, and the needle was advanced, rotated, and then rocked to remove a core sample. The core sample is removed from the needle, and the needle can be reinserted into the original hole to collect additional biopsy specimens. The core samples collected were rolled on a glass slide for a cytological preparation and evaluated while the patient was still under anesthesia to ensure that a diagnostic core sample was collected.     The incision was then flushed with a 3L bag of saline using pulse lavage and packed with 0.5\" iodoform packing into tracking area. The foot and leg were then cleaned and dressed with betadine-soaked gauze, dry 4x4 gauze, ABD, kerlix, ACE wrap.    POSTOPERATIVE INFORMATION: The patient tolerated the above noted procedure and anesthesia well and was transferred to the PACU with vital signs stable, and vascular status intact with capillary refill intact to the most distal aspect of the operative extremity. Postoperative instructions reviewed in detail with the patient and family with written instructions provided. Patient will return to floor. Should any problems, questions, or concerns arise, primary team to michelle or [facility] team.    This is Darwin Mejias, JANE/PGY-3  dictating " the operative note for Dr. Henry DPM.      Attending Attestation:       SIGNATURE: Darwin Mejias DPM PATIENT NAME: Lazarus Truong   DATE: July 17, 2024 MRN: [id number]   TIME: 3:23 PM

## 2024-07-17 NOTE — PROGRESS NOTES
Lazarus Truong is a 53 y.o. male on day 1 of admission presenting with Foot infection.      Subjective   Patient resting in bed comfortably awaiting surgery this afternoon.  He denies any pain in his foot       Objective     Last Recorded Vitals  /52 (BP Location: Left arm, Patient Position: Lying)   Pulse 70   Temp 36.3 °C (97.3 °F) (Temporal)   Resp 14   Wt (!) 161 kg (355 lb)   SpO2 97%   Intake/Output last 3 Shifts:    Intake/Output Summary (Last 24 hours) at 7/17/2024 1915  Last data filed at 7/17/2024 1821  Gross per 24 hour   Intake 900 ml   Output 600 ml   Net 300 ml       Admission Weight  Weight: (!) 161 kg (355 lb) (07/16/24 1209)    Daily Weight  07/16/24 : (!) 161 kg (355 lb)    Image Results  MR ankle right wo IV contrast  Narrative: Interpreted By:  Renard Byrd and Sullivan Shannon   STUDY:  MRI of the  right ankle without IV contrast;  7/16/2024 6:51 pm      INDICATION:  Signs/Symptoms:Abscess. History of Charcot ankle with status post  multiple surgeries including ankle reconstruction with talectomy and  tibiocalcaneal arthrodesis.      COMPARISON:  Right foot radiograph 07/16/2024, CT ankle 08/25/2023.      ACCESSION NUMBER(S):  [id number]      ORDERING CLINICIAN:  DERRICK MONTANO      TECHNIQUE:  MR imaging of the  right ankle was obtained  without administration  of intravenous contrast medium.      FINDINGS:  LIMITATIONS:  Somewhat suboptimal evaluation due to the absence of intravenous  contrast.      BONES, MARROW, AND JOINTS:      Extensive end-stage, destructive, erosive, and bony productive  changes throughout the ankle and midfoot, consistent with Charcot  ankle and foot arthropathy. There are postsurgical changes from  Charcot ankle reconstruction, including talectomy and intramedullary  bone graft from the tibia through to the calcaneus. The bone graft is  fragmented with destruction of the bone graft material. There is  marked cortical thickening, likely bony  remodeling, in the distal  tibia with abnormal loss of T1 marrow signal in the visualized distal  tibia. There is serpiginous peripheral STIR hyperintense signal and  central STIR hypointense signal in the distal tibia superior to the  intramedullary johan, as seen on image 16 of STIR sagittal images.      The inferior aspect of the intramedullary bone graft protrudes beyond  the calcaneus and into the flexor digitorum musculature. There is  extensive heterotopic ossification about the ankle and midfoot. There  is focal marrow edema about the superior aspect of the intramedullary  bone graft in the distal tibia, as seen in image 6 of the axial T2  sequence. Of note the entirety of the abnormal signal intensity in  the tibia is incompletely included in the field of view.      There is resection of the distal fibula. There is thickening of the  periosteum the distal fibula without definitive reactive marrow  change and likely related to chronic reactive periostitis.      There is patchy marrow edema with associated loss of normal T1 marrow  signal throughout the calcaneus.          SOFT TISSUES:      Immediately inferior to this area of marrow edema extending medially  from the superior aspect of the intramedullary johan, there is a  heterogeneously T2 hyperintense tubular tract coursing through the  cortex into the adjacent medial ankle soft tissues anterior to the  posterior tibialis tendon, best seen in image 8 of the axial T2  images and image 20 of the coronal T2 images. This tubular tract  likely represents a ghost tract from prior external fixation hardware  previously seen on the CT from 08/25/2023. The tubular tract measures  approximately 3.1 x 0.7 cm in the transverse dimension and 0.7 cm in  the craniocaudal dimension.      The medial-most margin of this tract appears to connect with a  lobulated loculated fluid collection that demonstrates heterogeneous  T2 hyperintense signal and heterogeneous intermediate  T1 signal,. The  fluid collection extends to the skin surface where there is bulging,  skin thickening, and marked focal medial ankle subcutaneous edema.  There are few internal foci of T1 and T2 hypointensity with may  reflect areas of mineralization/calcification or possibly gas, as  seen on image 14 of the axial T2 images, given the extension to the  skin surface. The superior portion of the fluid collection that  extends along the medial ankle subcutaneous tissues adjacent to the  posterior tibialis and flexor digitorum tendons measures  approximately 2.3 x 0.8 cm in the axial dimension and 2.8 cm in the  craniocaudal dimension, as seen on image 8 of the axial T2 images and  series 23 of the coronal T2 images. The dominant inferior most  portion of the collection measures approximately 4.0 x 1.9 cm in the  axial dimension and 3.9 cm in the craniocaudal dimension, as seen on  image 13 of the axial T2 images and image 9 of the sagittal STIR  images.      There is diffuse subcutaneous edema throughout the visualized ankle  and midfoot. Diffuse ill-defined increased T2 signal intensity is  seen in the musculature greatest in the plantar foot musculature  adjacent to the wound/fistula tract as seen on this noncontrast  examination. In the soft tissues of the anterolateral ankle between  the tibia and fibula as seen at image 6 of the axial 19 of the  coronal and 22 of the sagittal plane there is a high T2 low T1 signal  intensity collection measuring a proximally 1.4 x 2.1 x 1.5 cm.      There is moderate posterior tibialis tendinosis and tenosynovitis,  severe peroneal tendinosis, and mild Achilles tendinosis. There is  nonvisualization of the peroneal tendons beyond the level of the  calcaneus, which may reflect postsurgical or traumatic tendon tearing.      Impression: Limited exam due to absence of intravenous contrast. Within the  limitations:  1. Findings which are felt to represent high likelihood  of  osteomyelitis involving the distal tibia through to the calcaneus  with tract through a cloaca versus whole tract from prior drilling of  the distal medial tibial metaphyseal cortex which communicates with a  fistula tract with soft tissue collection of the medial distal leg  with overlying wound/ulcer at the posteromedial distal leg/ankle. The  fistula tract contains heterogeneous fluid suspicious for abscess  formation along the fistula tract. Multiple foci of susceptibility  artifact within the fistula tract may be related to gas which could  be related to the open nature of the process. There is also an ulcer  at the plantar hindfoot into midfoot with fistula tract to the  surface of the calcaneal opening for the arthrodesis. There is  associated high likelihood of osteomyelitis of the fragmented bone  graft material within the tibia through to the calcaneus. Small  volume of fluid in the remnant region of the ankle joint is of  indeterminate sterility but given the above findings, septic  arthritis may be present.  2. 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  3. Cellulitis and/or lymphedema the visualized lower extremity. There  is possibly a collection of the anterolateral ankle which may  represent an abscess versus a region of focal cellulitis versus  communication of the ankle joint up along the lateral surface of the  distal tibia.  4. Edema in the musculature which may be related to ischemic/diabetic  myopathy although a component of infectious myositis particularly  adjacent to the soft tissue tract of the plantar foot is not entirely  excluded.  5. Consideration of repeat MRI with the administration of gadolinium  base contrast may be helpful. Furthermore the entirety of the process  is incompletely included in the field of view at least with regards  to the tibial signal intensity and MRI of the tibia and fibula is  recommended to assess proximal  extent of the process.      I personally reviewed the images/study and I agree with the findings  as stated. This study was interpreted at [facility], [place], [place].      MACRO:  None      Signed by: Renard Byrd 7/17/2024 12:08 PM  Dictation workstation:   [id number]      Physical Exam  Constitutional:       Appearance: Normal appearance.   Cardiovascular:      Rate and Rhythm: Normal rate and regular rhythm.      Pulses: Normal pulses.      Heart sounds: Normal heart sounds.   Pulmonary:      Effort: Pulmonary effort is normal.      Breath sounds: Normal breath sounds.   Abdominal:      General: Bowel sounds are normal.      Palpations: Abdomen is soft.   Skin:     General: Skin is warm and dry.      Comments: Right foot with dressing dry and intact   Neurological:      Mental Status: He is alert and oriented to person, place, and time.         Relevant Results             Results for orders placed or performed during the hospital encounter of 07/16/24 (from the past 24 hour(s))   POCT GLUCOSE   Result Value Ref Range    POCT Glucose 161 (H) 74 - 99 mg/dL   CBC   Result Value Ref Range    WBC 5.5 4.4 - 11.3 x10*3/uL    nRBC 0.0 0.0 - 0.0 /100 WBCs    RBC 4.19 (L) 4.50 - 5.90 x10*6/uL    Hemoglobin 11.2 (L) 13.5 - 17.5 g/dL    Hematocrit 36.1 (L) 41.0 - 52.0 %    MCV 86 80 - 100 fL    MCH 26.7 26.0 - 34.0 pg    MCHC 31.0 (L) 32.0 - 36.0 g/dL    RDW 13.2 11.5 - 14.5 %    Platelets 215 150 - 450 x10*3/uL   Basic metabolic panel   Result Value Ref Range    Glucose 151 (H) 65 - 99 mg/dL    Sodium 134 133 - 145 mmol/L    Potassium 4.6 3.4 - 5.1 mmol/L    Chloride 103 97 - 107 mmol/L    Bicarbonate 23 (L) 24 - 31 mmol/L    Urea Nitrogen 17 8 - 25 mg/dL    Creatinine 1.10 0.40 - 1.60 mg/dL    eGFR 80 >60 mL/min/1.73m*2    Calcium 8.3 (L) 8.5 - 10.4 mg/dL    Anion Gap 8 <=19 mmol/L   Hemoglobin A1c   Result Value Ref Range    Hemoglobin A1C 7.1 (H) See below %    Estimated Average  Glucose 157 Not Established mg/dL   POCT GLUCOSE   Result Value Ref Range    POCT Glucose 103 (H) 74 - 99 mg/dL   POCT GLUCOSE   Result Value Ref Range    POCT Glucose 90 74 - 99 mg/dL       Assessment/Plan                  Principal Problem:    Foot infection  Active Problems:    Lymphedema    Foot abscess, right    Cellulitis of right foot    Foot infection  Continue antibiotics per podiatry, IV Vanco/cefepime  Plan for surgery 7/17/2024,  Has been N.p.o. after midnight  Patient confirms he does not take any home medications  DVT  Prophylaxis  Sequential teds  Ambulate ad celina    Plan of Care   Patient will be discharged home without any needs possibly tomorrow after surgical intervention.    Plan of care discussed with patient and collaborating physician, Dr. Sarwat Maria, APRN-CNP

## 2024-07-17 NOTE — ANESTHESIA PREPROCEDURE EVALUATION
Patient: Lazarus Truong    Procedure Information       Date/Time: 07/17/24 1245    Procedure: INCISION AND DRAINAGE,FOOT (Right)    Location: [facility] / [facility]    Surgeons: David Figueroa DPM            Relevant Problems   Cardiac   (+) Atrial fibrillation (Multi)   (+) CAD (coronary artery disease)      Pulmonary   (+) Asthma (HHS-HCC)      Endocrine   (+) Diabetes mellitus type 1 (Multi)   (+) Diabetes mellitus, type 2 (Multi)   (+) Diabetic neuropathy (Multi)   (+) Obesity      ID   (+) Foot infection     Past Surgical History:   Procedure Laterality Date    FOOT SURGERY           Clinical information reviewed:    Allergies  Meds  Problems              NPO Detail:  NPO/Void Status  Carbohydrate Drink Given Prior to Surgery? : N  Date of Last Liquid: 07/16/24  Time of Last Liquid: 2345  Date of Last Solid: 07/16/24  Time of Last Solid: 2100  Last Intake Type: Clear fluids  Time of Last Void: 1300         Physical Exam    Airway  Mallampati: III     Cardiovascular    Dental    Pulmonary    Abdominal            Anesthesia Plan    History of general anesthesia?: yes  History of complications of general anesthesia?: no    ASA 3     MAC     The patient is not a current smoker.    intravenous induction   Anesthetic plan and risks discussed with patient.    Plan discussed with CAA.

## 2024-07-17 NOTE — PROGRESS NOTES
Lazarus Truong is a 53 y.o. male on day 1 of admission presenting with Foot infection.      Subjective     Patient currently is doing well today.  He recently had a right foot Charcot reconstruction on 6/23, and debridement for chronic ulcer on 7/10/2024.  He is awaiting scheduled surgery on 7/17/2024.  He reports having his operation scheduled for tomorrow.  He does report past medical history of asthma and takes albuterol as needed, one-time paroxysmal A-fib 1 year ago was placed on apixaban but stopped taking the medication after running out of his prescription 1 year ago.  He denies having coronary artery disease, STEMI or stents.  He did mention he did had a history of prediabetes and is not on any medications.  He does not take any blood pressure medication.  Patient pretty much reported that he does not take any medications at home. He does not have any chest pain, shortness of breath, nausea vomiting diarrhea, fever or chills.       Objective     Physical Exam  Constitutional:       Appearance: He is obese.   HENT:      Head: Normocephalic and atraumatic.      Mouth/Throat:      Mouth: Mucous membranes are moist.   Eyes:      Extraocular Movements: Extraocular movements intact.   Cardiovascular:      Rate and Rhythm: Normal rate and regular rhythm.   Pulmonary:      Effort: Pulmonary effort is normal.      Breath sounds: Normal breath sounds.   Abdominal:      General: Bowel sounds are normal.   Musculoskeletal:      Cervical back: Normal range of motion and neck supple.   Skin:     General: Skin is warm and dry.   Neurological:      General: No focal deficit present.      Mental Status: He is alert and oriented to person, place, and time.   Psychiatric:         Mood and Affect: Mood normal.         Behavior: Behavior normal.     Right foot currently covered with dressing    Last Recorded Vitals  Blood pressure 107/67, pulse 68, temperature 36.8 °C (98.2 °F), temperature source Temporal, resp. rate 16,  weight (!) 161 kg (355 lb), SpO2 99%.  Intake/Output last 3 Shifts:  I/O last 3 completed shifts:  In: 400 (2.5 mL/kg) [IV Piggyback:400]  Out: - (0 mL/kg)   Weight: 161 kg     Relevant Results  Scheduled medications  docusate sodium, 100 mg, oral, BID  lisinopril, 5 mg, oral, Daily      Continuous medications     PRN medications  PRN medications: acetaminophen, oxyCODONE, oxyCODONE  Results for orders placed or performed during the hospital encounter of 07/16/24 (from the past 24 hour(s))   BLOOD GAS LACTIC ACID, VENOUS   Result Value Ref Range    POCT Lactate, Venous 0.9 0.4 - 2.0 mmol/L   CBC and Auto Differential   Result Value Ref Range    WBC 5.5 4.4 - 11.3 x10*3/uL    nRBC 0.0 0.0 - 0.0 /100 WBCs    RBC 4.71 4.50 - 5.90 x10*6/uL    Hemoglobin 12.8 (L) 13.5 - 17.5 g/dL    Hematocrit 40.4 (L) 41.0 - 52.0 %    MCV 86 80 - 100 fL    MCH 27.2 26.0 - 34.0 pg    MCHC 31.7 (L) 32.0 - 36.0 g/dL    RDW 13.2 11.5 - 14.5 %    Platelets 250 150 - 450 x10*3/uL    Neutrophils % 53.9 40.0 - 80.0 %    Immature Granulocytes %, Automated 0.2 0.0 - 0.9 %    Lymphocytes % 35.4 13.0 - 44.0 %    Monocytes % 6.7 2.0 - 10.0 %    Eosinophils % 3.1 0.0 - 6.0 %    Basophils % 0.7 0.0 - 2.0 %    Neutrophils Absolute 2.98 1.20 - 7.70 x10*3/uL    Immature Granulocytes Absolute, Automated 0.01 0.00 - 0.70 x10*3/uL    Lymphocytes Absolute 1.96 1.20 - 4.80 x10*3/uL    Monocytes Absolute 0.37 0.10 - 1.00 x10*3/uL    Eosinophils Absolute 0.17 0.00 - 0.70 x10*3/uL    Basophils Absolute 0.04 0.00 - 0.10 x10*3/uL   Comprehensive Metabolic Panel   Result Value Ref Range    Glucose 136 (H) 65 - 99 mg/dL    Sodium 134 133 - 145 mmol/L    Potassium 4.9 3.4 - 5.1 mmol/L    Chloride 100 97 - 107 mmol/L    Bicarbonate 26 24 - 31 mmol/L    Urea Nitrogen 18 8 - 25 mg/dL    Creatinine 1.10 0.40 - 1.60 mg/dL    eGFR 80 >60 mL/min/1.73m*2    Calcium 8.8 8.5 - 10.4 mg/dL    Albumin 3.3 (L) 3.5 - 5.0 g/dL    Alkaline Phosphatase 122 35 - 125 U/L    Total  Protein 8.0 (H) 5.9 - 7.9 g/dL    AST 19 5 - 40 U/L    Bilirubin, Total 0.3 0.1 - 1.2 mg/dL    ALT 11 5 - 40 U/L    Anion Gap 8 <=19 mmol/L   Blood Culture    Specimen: Peripheral Venipuncture; Blood culture   Result Value Ref Range    Blood Culture Loaded on Instrument - Culture in progress    Blood Culture    Specimen: Peripheral Venipuncture; Blood culture   Result Value Ref Range    Blood Culture Loaded on Instrument - Culture in progress    Sedimentation rate, automated   Result Value Ref Range    Sedimentation Rate 97 (H) 0 - 20 mm/h   C-reactive protein   Result Value Ref Range    C-Reactive Protein 3.00 (H) 0.00 - 2.00 mg/dL   POCT GLUCOSE   Result Value Ref Range    POCT Glucose 161 (H) 74 - 99 mg/dL     XR foot right 3+ views    Result Date: 7/16/2024  Interpreted By:  Cheli Rondon, STUDY: XR FOOT RIGHT 3+ VIEWS 7/16/2024 12:39 pm   INDICATION: Ulceration and pain   COMPARISON: 09/09/2023   ACCESSION NUMBER(S): [id number]   ORDERING CLINICIAN: NARCISO ALLEN   TECHNIQUE: Three views of right foot   FINDINGS: Intramedullary johan extends from the distal tibia and into the calcaneus with intramedullary johan extending into the deep plantar soft tissues. This is unchanged. There is lucency identified about the intramedullary johan which may indicate loosening of the johan. There is extensive arterial calcification. There has been osteotomy of the distal tibia and distal fibula sclerosis and osteophytosis of the distal tibia with considerable sclerosis and osteophytosis of the distal articular surface of the tibia. Soft tissue swelling of the foot is present. There is cortical thickening of the proximal to mid 5th metatarsal laterally.   No obvious plain film evidence for osteomyelitis is demonstrated.       No obvious plain film evidence to indicate osteomyelitis.   Extensive arterial calcification is observed consistent with diabetes.   Osteotomy of the distal tibia and distal fibula with intramedullary johan  extending from the distal tibia into the calcaneus. There is some radiolucency about the intramedullary johan which may indicate loosening of the johan.   The johan extends into the deep subcutaneous tissues along the plantar aspect of the foot without interval change.   Signed by: Cheli Rondon 7/16/2024 1:27 PM Dictation workstation:   [id number]    EKG 12 lead    Result Date: 7/15/2024  Normal sinus rhythm Right bundle branch block Abnormal ECG When compared with ECG of 10-MONIQUE-2024 11:32, No significant change was found Confirmed by Jake Corrigan ([id number]) on 7/15/2024 8:42:29 AM         Assessment/Plan   Principal Problem:    Foot infection    Plan:  Continue antibiotics per podiatry, IV Vanco/cefepime  Plan for surgery 7/17/2024,  N.p.o. after midnight  Patient confirms he does not take any home medications  No anticoagulation, may resume when appropriate and cleared by surgery      I spent 35 minutes in the professional and overall care of this patient.      Luis Hernandez MD

## 2024-07-17 NOTE — PROGRESS NOTES
07/17/24 1126   Discharge Planning   Living Arrangements Alone   Support Systems Family members   Assistance Needed independent   Type of Residence Private residence   Number of Stairs to Enter Residence 4   Number of Stairs Within Residence 0   Do you have animals or pets at home? No   Who is requesting discharge planning? Provider   Home or Post Acute Services None   Expected Discharge Disposition Home   Does the patient need discharge transport arranged? No     Debridement today.    Likely, home no needs.

## 2024-07-17 NOTE — ANESTHESIA POSTPROCEDURE EVALUATION
Patient: Lazarus Truong    Procedure Summary       Date: 07/17/24 Room / Location: [facility] / [facility]    Anesthesia Start: 1443 Anesthesia Stop: 1520    Procedure: INCISION AND DRAINAGE,FOOT (Right) Diagnosis:       Lymphedema      Foot abscess, right      Cellulitis of right foot      (Lymphedema [I89.0])      (Foot abscess, right [L02.611])      (Cellulitis of right foot [L03.115])    Surgeons: David Figueroa DPM Responsible Provider: Jaleel Vegas MD    Anesthesia Type: MAC ASA Status: 3            Anesthesia Type: MAC    Vitals Value Taken Time   /70 07/17/24 1541   Temp 36 °C (96.8 °F) 07/17/24 1519   Pulse 70 07/17/24 1545   Resp 18 07/17/24 1545   SpO2 94 % 07/17/24 1545   Vitals shown include unfiled device data.    Anesthesia Post Evaluation    Patient location during evaluation: PACU  Patient participation: complete - patient participated  Level of consciousness: awake and alert  Pain score: 0  Pain management: adequate  Multimodal analgesia pain management approach  Airway patency: patent  Two or more strategies used to mitigate risk of obstructive sleep apnea  Cardiovascular status: acceptable and blood pressure returned to baseline  Respiratory status: acceptable  Hydration status: acceptable  Postoperative Nausea and Vomiting: none        There were no known notable events for this encounter.

## 2024-07-17 NOTE — CARE PLAN
The patient's goals for the shift include      The clinical goals for the shift include IV antibiotic, NPO after midnight      Problem: Pain - Adult  Goal: Verbalizes/displays adequate comfort level or baseline comfort level  Outcome: Progressing     Problem: Safety - Adult  Goal: Free from fall injury  Outcome: Progressing

## 2024-07-17 NOTE — CONSULTS
Vancomycin Dosing by Pharmacy- INITIAL    Lazarus Truong is a 53 y.o. year old male who Pharmacy has been consulted for vancomycin dosing for cellulitis, skin and soft tissue. Based on the patient's indication and renal status this patient will be dosed based on a goal AUC of 400-600.     Renal function is currently stable.    Visit Vitals  /69   Pulse 68   Temp 36 °C (96.8 °F) (Temporal)   Resp 19        Lab Results   Component Value Date    CREATININE 1.10 2024    CREATININE 1.10 2024    CREATININE 1.10 07/10/2024    CREATININE 0.90 01/10/2024        Patient weight is as follows:   Vitals:    24 1209   Weight: (!) 161 kg (355 lb)       Cultures:  No results found for the encounter in last 14 days.        I/O last 3 completed shifts:  In: 650 (4 mL/kg) [P.O.:250; IV Piggyback:400]  Out: 0 (0 mL/kg)   Weight: 161 kg   I/O during current shift:  I/O this shift:  In: 300 [I.V.:300]  Out: 600 [Urine:600]    Temp (24hrs), Av.3 °C (97.3 °F), Min:35.9 °C (96.6 °F), Max:36.9 °C (98.4 °F)         Assessment/Plan     Patient received vancomycin 2 gm x 1 in ED  ~ 1300.   Will initiate vancomycin maintenance,  1000 mg every 12 hours, first dose  @ 2000.    This dosing regimen is predicted by [employer] to result in the following pharmacokinetic parameters:  Loading dose: N/A  Regimen: 1000 mg IV every 12 hours.  Start time: 01:09 on 2024  Exposure target: AUC24 (range)400-600 mg/L.hr   AUC24,ss: 462 mg/L.hr  Probability of AUC24 > 400: 61 %  Ctrough,ss: 12.9 mg/L  Probability of Ctrough,ss > 20: 28 %  Probability of nephrotoxicity (Lodise MARIPOSA ): 8 %      Follow-up level will be ordered on  with AM labs, unless clinically indicated sooner.  Will continue to monitor renal function daily while on vancomycin and order serum creatinine at least every 48 hours if not already ordered.  Follow for continued vancomycin needs, clinical response, and signs/symptoms of toxicity.       Jaleel  ZAMZAM Calhoun, PharmD

## 2024-07-18 LAB
GLUCOSE BLD MANUAL STRIP-MCNC: 135 MG/DL (ref 74–99)
GLUCOSE BLD MANUAL STRIP-MCNC: 142 MG/DL (ref 74–99)
GLUCOSE BLD MANUAL STRIP-MCNC: 163 MG/DL (ref 74–99)
VANCOMYCIN SERPL-MCNC: 12.7 UG/ML (ref 10–20)

## 2024-07-18 PROCEDURE — 2500000001 HC RX 250 WO HCPCS SELF ADMINISTERED DRUGS (ALT 637 FOR MEDICARE OP): Performed by: NURSE PRACTITIONER

## 2024-07-18 PROCEDURE — 36415 COLL VENOUS BLD VENIPUNCTURE: CPT

## 2024-07-18 PROCEDURE — 82947 ASSAY GLUCOSE BLOOD QUANT: CPT

## 2024-07-18 PROCEDURE — 2500000004 HC RX 250 GENERAL PHARMACY W/ HCPCS (ALT 636 FOR OP/ED): Mod: JZ

## 2024-07-18 PROCEDURE — 80202 ASSAY OF VANCOMYCIN: CPT

## 2024-07-18 PROCEDURE — 1100000001 HC PRIVATE ROOM DAILY

## 2024-07-18 ASSESSMENT — COGNITIVE AND FUNCTIONAL STATUS - GENERAL
MOBILITY SCORE: 24
DAILY ACTIVITIY SCORE: 24

## 2024-07-18 ASSESSMENT — PAIN SCALES - GENERAL
PAINLEVEL_OUTOF10: 0 - NO PAIN
PAINLEVEL_OUTOF10: 0 - NO PAIN

## 2024-07-18 NOTE — PROGRESS NOTES
Vancomycin Dosing by Pharmacy- FOLLOW UP    Lazarus Truong is a 53 y.o. year old male who Pharmacy has been consulted for vancomycin dosing for cellulitis, skin and soft tissue. Based on the patient's indication and renal status this patient is being dosed based on a goal AUC of 400-600.   Day 3    Renal function is currently stable.    Current vancomycin dose: 1000 mg given every 12 hours 0800/2000    Estimated vancomycin AUC on current dose: 479 mg/L.hr     Visit Vitals  /68 (BP Location: Left arm, Patient Position: Lying) Comment (BP Location): forearm   Pulse 78   Temp 36.5 °C (97.7 °F) (Temporal)   Resp 16        Lab Results   Component Value Date    CREATININE 1.10 2024    CREATININE 1.10 2024    CREATININE 1.10 07/10/2024    CREATININE 0.90 01/10/2024        Patient weight is as follows:   Vitals:    24 1209   Weight: (!) 161 kg (355 lb)       Cultures:  No results found for the encounter in last 14 days.       I/O last 3 completed shifts:  In: 1300 (8.1 mL/kg) [P.O.:550; I.V.:300 (1.9 mL/kg); IV Piggyback:450]  Out: 600 (3.7 mL/kg) [Urine:600 (0.1 mL/kg/hr)]  Weight: 161 kg   I/O during current shift:  I/O this shift:  In: 700 [P.O.:500; IV Piggyback:200]  Out: 1000 [Urine:1000]    Temp (24hrs), Av.3 °C (97.4 °F), Min:36 °C (96.8 °F), Max:36.9 °C (98.4 °F)      Assessment/Plan    Within goal AUC range. Continue current vancomycin regimen. 1000 mg every 12 hours    This dosing regimen is predicted by [employer] to result in the following pharmacokinetic parameters:  Loading dose: N/A  Regimen: 1000 mg IV every 12 hours.  Start time: 09:45 on 2024  Exposure target: AUC24 (range)400-600 mg/L.hr   AUC24,ss: 479 mg/L.hr  Probability of AUC24 > 400: 88 %  Ctrough,ss: 13.4 mg/L  Probability of Ctrough,ss > 20: 17 %  Probability of nephrotoxicity (Lodise MARIPOSA ): 9 %      The next level will be obtained on 24 at 0500. May be obtained sooner if clinically indicated.   Will  continue to monitor renal function daily while on vancomycin and order serum creatinine at least every 48 hours if not already ordered.  Follow for continued vancomycin needs, clinical response, and signs/symptoms of toxicity.       Anson Alvarado, PharmD

## 2024-07-18 NOTE — CARE PLAN
The patient's goals for the shift include      The clinical goals for the shift include IV antibiotics, remain free from falls      Problem: Pain - Adult  Goal: Verbalizes/displays adequate comfort level or baseline comfort level  Outcome: Progressing     Problem: Safety - Adult  Goal: Free from fall injury  Outcome: Progressing

## 2024-07-18 NOTE — NURSING NOTE
Agree with previous assessment. Patient tolerating IV antibiotics, no adverse reactions. Right foot dressing dry and intact. Call light within reach.

## 2024-07-18 NOTE — PROGRESS NOTES
07/18/24 1332   Discharge Planning   Expected Discharge Disposition Home   Does the patient need discharge transport arranged? No     Open wound with packing.   Closure scheduled for Friday.  Should have no needs going home.

## 2024-07-18 NOTE — CARE PLAN
The patient's goals for the shift include      The clinical goals for the shift include IV antibiotics, remain free from falls      Problem: Pain - Adult  Goal: Verbalizes/displays adequate comfort level or baseline comfort level  Outcome: Progressing     Problem: Safety - Adult  Goal: Free from fall injury  Outcome: Progressing     Problem: Discharge Planning  Goal: Discharge to home or other facility with appropriate resources  Outcome: Progressing     Problem: Chronic Conditions and Co-morbidities  Goal: Patient's chronic conditions and co-morbidity symptoms are monitored and maintained or improved  Outcome: Progressing

## 2024-07-18 NOTE — PROGRESS NOTES
PODIATRY SERVICE CONSULT PROGRESS NOTE    SERVICE DATE: 7/18/2024   SERVICE TIME:  1530    Subjective   INTERVAL HPI:   POD #1 s/p incision to bone cortex right foot, bone biopsy right foot (DOS 7/18/24). Pt was seen at bedside. Pain well controlled. Patient denies any constitutional symptoms. No other pedal complaints.     Medications:  Scheduled Meds: cefepime, 2 g, intravenous, q12h  docusate sodium, 100 mg, oral, BID  [Held by provider] lisinopril, 5 mg, oral, Daily  vancomycin, 1 g, intravenous, q12h      Continuous Infusions:    PRN Meds: PRN medications: acetaminophen, hydrOXYzine pamoate, oxyCODONE, oxyCODONE, vancomycin         Objective   PHYSICAL EXAM:  Physical Exam Performed:  Vitals:    07/18/24 0744   BP: 108/55   Pulse: 70   Resp: 18   Temp: 36.8 °C (98.2 °F)   SpO2: 96%     Body mass index is 52.42 kg/m².    Patient is AOx3 and in no acute distress. Patient is alert and cooperative. Sitting comfortably in bed with dressing clean, dry and intact. Post-surgical dressing in place right foot w/o strikethrough.     Vascular: Faintly palpable DP/PT pulses B/L. Severe pitting edema noted B/L. Hair growth absent B/L. CFT<5 to B/L hallux. Temperature is warm to cool from tibial tuberosity to distal digits B/L. No lymphatic streaking noted B/L.     Musculoskeletal: Gross active and passive ROM intact to age and activity level except at right ankle. Moves all extremities spontaneously. Pain to palpation right medial heel     Neurological: Intact light touch sensation B/L. Pain stimuli diminished B/L. Denies any numbness, burning or tingling.     Dermatologic: Skin appears taut but well hydrated B/L. Web spaces 1-4 B/L are clean, dry and intact. No rashes or nodules noted B/L. No hyperkeratotic tissue noted B/L.  Incision to dorsomedial right foot with skin edges well-coapted with sutures intact. Incision to right ankle posterior ankle medially with 4 cm long with granular base with minimal slough,  non-macerated heidy-incision, minimal heidy-incisional erythema, no mild-moderate serosanguinous drainage without noted purulence. Erythema at this site which does not extend proximally,focal calor noted,minimal malodor.    LABS:   Results for orders placed or performed during the hospital encounter of 07/16/24 (from the past 24 hour(s))   POCT GLUCOSE   Result Value Ref Range    POCT Glucose 90 74 - 99 mg/dL   POCT GLUCOSE   Result Value Ref Range    POCT Glucose 159 (H) 74 - 99 mg/dL   Vancomycin   Result Value Ref Range    Vancomycin 12.7 10.0 - 20.0 ug/mL   POCT GLUCOSE   Result Value Ref Range    POCT Glucose 142 (H) 74 - 99 mg/dL   POCT GLUCOSE   Result Value Ref Range    POCT Glucose 135 (H) 74 - 99 mg/dL      Lab Results   Component Value Date    HGBA1C 7.1 (H) 07/17/2024      Lab Results   Component Value Date    CRP 3.00 (H) 07/16/2024      Lab Results   Component Value Date    SEDRATE 97 (H) 07/16/2024        Results from last 7 days   Lab Units 07/17/24  0453   WBC AUTO x10*3/uL 5.5   RBC AUTO x10*6/uL 4.19*   HEMOGLOBIN g/dL 11.2*   HEMATOCRIT % 36.1*     Results from last 7 days   Lab Units 07/17/24  0453 07/16/24  1226   SODIUM mmol/L 134 134   POTASSIUM mmol/L 4.6 4.9   CHLORIDE mmol/L 103 100   CO2 mmol/L 23* 26   BUN mg/dL 17 18   CREATININE mg/dL 1.10 1.10   CALCIUM mg/dL 8.3* 8.8   BILIRUBIN TOTAL mg/dL  --  0.3   ALT U/L  --  11   AST U/L  --  19           IMAGING REVIEW:  MR ankle right wo IV contrast    Result Date: 7/17/2024  Interpreted By:  Renard Byrd and Sullivan Shannon STUDY: MRI of the  right ankle without IV contrast;  7/16/2024 6:51 pm   INDICATION: Signs/Symptoms:Abscess. History of Charcot ankle with status post multiple surgeries including ankle reconstruction with talectomy and tibiocalcaneal arthrodesis.   COMPARISON: Right foot radiograph 07/16/2024, CT ankle 08/25/2023.   ACCESSION NUMBER(S): [id number]   ORDERING CLINICIAN: DERRICK FRANIA   TECHNIQUE: MR imaging of the   right ankle was obtained  without administration of intravenous contrast medium.   FINDINGS: LIMITATIONS: Somewhat suboptimal evaluation due to the absence of intravenous contrast.   BONES, MARROW, AND JOINTS:   Extensive end-stage, destructive, erosive, and bony productive changes throughout the ankle and midfoot, consistent with Charcot ankle and foot arthropathy. There are postsurgical changes from Charcot ankle reconstruction, including talectomy and intramedullary bone graft from the tibia through to the calcaneus. The bone graft is fragmented with destruction of the bone graft material. There is marked cortical thickening, likely bony remodeling, in the distal tibia with abnormal loss of T1 marrow signal in the visualized distal tibia. There is serpiginous peripheral STIR hyperintense signal and central STIR hypointense signal in the distal tibia superior to the intramedullary johan, as seen on image 16 of STIR sagittal images.   The inferior aspect of the intramedullary bone graft protrudes beyond the calcaneus and into the flexor digitorum musculature. There is extensive heterotopic ossification about the ankle and midfoot. There is focal marrow edema about the superior aspect of the intramedullary bone graft in the distal tibia, as seen in image 6 of the axial T2 sequence. Of note the entirety of the abnormal signal intensity in the tibia is incompletely included in the field of view.   There is resection of the distal fibula. There is thickening of the periosteum the distal fibula without definitive reactive marrow change and likely related to chronic reactive periostitis.   There is patchy marrow edema with associated loss of normal T1 marrow signal throughout the calcaneus.     SOFT TISSUES:   Immediately inferior to this area of marrow edema extending medially from the superior aspect of the intramedullary johan, there is a heterogeneously T2 hyperintense tubular tract coursing through the cortex into the  adjacent medial ankle soft tissues anterior to the posterior tibialis tendon, best seen in image 8 of the axial T2 images and image 20 of the coronal T2 images. This tubular tract likely represents a ghost tract from prior external fixation hardware previously seen on the CT from 08/25/2023. The tubular tract measures approximately 3.1 x 0.7 cm in the transverse dimension and 0.7 cm in the craniocaudal dimension.   The medial-most margin of this tract appears to connect with a lobulated loculated fluid collection that demonstrates heterogeneous T2 hyperintense signal and heterogeneous intermediate T1 signal,. The fluid collection extends to the skin surface where there is bulging, skin thickening, and marked focal medial ankle subcutaneous edema. There are few internal foci of T1 and T2 hypointensity with may reflect areas of mineralization/calcification or possibly gas, as seen on image 14 of the axial T2 images, given the extension to the skin surface. The superior portion of the fluid collection that extends along the medial ankle subcutaneous tissues adjacent to the posterior tibialis and flexor digitorum tendons measures approximately 2.3 x 0.8 cm in the axial dimension and 2.8 cm in the craniocaudal dimension, as seen on image 8 of the axial T2 images and series 23 of the coronal T2 images. The dominant inferior most portion of the collection measures approximately 4.0 x 1.9 cm in the axial dimension and 3.9 cm in the craniocaudal dimension, as seen on image 13 of the axial T2 images and image 9 of the sagittal STIR images.   There is diffuse subcutaneous edema throughout the visualized ankle and midfoot. Diffuse ill-defined increased T2 signal intensity is seen in the musculature greatest in the plantar foot musculature adjacent to the wound/fistula tract as seen on this noncontrast examination. In the soft tissues of the anterolateral ankle between the tibia and fibula as seen at image 6 of the axial 19 of  the coronal and 22 of the sagittal plane there is a high T2 low T1 signal intensity collection measuring a proximally 1.4 x 2.1 x 1.5 cm.   There is moderate posterior tibialis tendinosis and tenosynovitis, severe peroneal tendinosis, and mild Achilles tendinosis. There is nonvisualization of the peroneal tendons beyond the level of the calcaneus, which may reflect postsurgical or traumatic tendon tearing.       Limited exam due to absence of intravenous contrast. Within the limitations: 1. Findings which are felt to represent high likelihood of osteomyelitis involving the distal tibia through to the calcaneus with tract through a cloaca versus whole tract from prior drilling of the distal medial tibial metaphyseal cortex which communicates with a fistula tract with soft tissue collection of the medial distal leg with overlying wound/ulcer at the posteromedial distal leg/ankle. The fistula tract contains heterogeneous fluid suspicious for abscess formation along the fistula tract. Multiple foci of susceptibility artifact within the fistula tract may be related to gas which could be related to the open nature of the process. There is also an ulcer at the plantar hindfoot into midfoot with fistula tract to the surface of the calcaneal opening for the arthrodesis. There is associated high likelihood of osteomyelitis of the fragmented bone graft material within the tibia through to the calcaneus. Small volume of fluid in the remnant region of the ankle joint is of indeterminate sterility but given the above findings, septic arthritis may be present. 2. Findings compatible with sequelae of neuropathic arthropathy in the midfoot and hindfoot. 3. Cellulitis and/or lymphedema the visualized lower extremity. There is possibly a collection of the anterolateral ankle which may represent an abscess versus a region of focal cellulitis versus communication of the ankle joint up along the lateral surface of the distal tibia. 4.  Edema in the musculature which may be related to ischemic/diabetic myopathy although a component of infectious myositis particularly adjacent to the soft tissue tract of the plantar foot is not entirely excluded. 5. Consideration of repeat MRI with the administration of gadolinium base contrast may be helpful. Furthermore the entirety of the process is incompletely included in the field of view at least with regards to the tibial signal intensity and MRI of the tibia and fibula is recommended to assess proximal extent of the process.   I personally reviewed the images/study and I agree with the findings as stated. This study was interpreted at [place], [place].   MACRO: None   Signed by: Renard Byrd 7/17/2024 12:08 PM Dictation workstation:   [id number]    XR foot right 3+ views    Result Date: 7/16/2024  Interpreted By:  Cheli Rondon, STUDY: XR FOOT RIGHT 3+ VIEWS 7/16/2024 12:39 pm   INDICATION: Ulceration and pain   COMPARISON: 09/09/2023   ACCESSION NUMBER(S): [id number]   ORDERING CLINICIAN: NARCISO ALLEN   TECHNIQUE: Three views of right foot   FINDINGS: Intramedullary johan extends from the distal tibia and into the calcaneus with intramedullary johan extending into the deep plantar soft tissues. This is unchanged. There is lucency identified about the intramedullary johan which may indicate loosening of the johan. There is extensive arterial calcification. There has been osteotomy of the distal tibia and distal fibula sclerosis and osteophytosis of the distal tibia with considerable sclerosis and osteophytosis of the distal articular surface of the tibia. Soft tissue swelling of the foot is present. There is cortical thickening of the proximal to mid 5th metatarsal laterally.   No obvious plain film evidence for osteomyelitis is demonstrated.       No obvious plain film evidence to indicate osteomyelitis.   Extensive arterial calcification is observed consistent  with diabetes.   Osteotomy of the distal tibia and distal fibula with intramedullary johan extending from the distal tibia into the calcaneus. There is some radiolucency about the intramedullary johan which may indicate loosening of the johan.   The johan extends into the deep subcutaneous tissues along the plantar aspect of the foot without interval change.   Signed by: Cheli Rondon 7/16/2024 1:27 PM Dictation workstation:   [id number]    EKG 12 lead    Result Date: 7/15/2024  Normal sinus rhythm Right bundle branch block Abnormal ECG When compared with ECG of 10-MONIQUE-2024 11:32, No significant change was found Confirmed by Jake Corrigan ([id number]) on 7/15/2024 8:42:29 AM            Assessment/Plan   ASSESSMENT & PLAN:    Patient is a 53-year-old male with PMH of A-fib, CAD with h/o STEMI, DM2, morbid obesity, lymphedema, Charcot ankle who presented to [facility] emergency department for evaluation of possible infection to right foot. Patient has had multiple surgeries to the right foot including charcot reconstruction and several wound debridements, as well as excision of ulceration with advancement flap closure of chronic ulceration last week (7/10/24).    #POD #1 s/p incision to bone cortex right foot, bone biopsy right foot (DOS 7/18/24)  #Cellulitis with abscess right foot  #Lymphedema  #T2DM with neuropathy  #S/p charcot recon (6/2023)  #Morbid obesity     Plan:     - Patient was seen and evaluated; all findings were discussed and all questions were answered to patient's satisfaction.  - Charts, labs, vitals and imaging all reviewed.   - Imaging: X-rays no indication of OM. MRI concerning for OM but preliminary result indicated no organisms seen. Will await final result to determine if ID should be needed for a PICC line.  - Labs: See above. ESR 97, CRP 3, no white count.  - Wound culture: Pending final result. Preliminary bone cx no organisms. Most recent MSSA 1 week ago.   - Blood culture: Pending. NTD.     Plan:  - Abx:  "IV vanc/cefepime. Plan to continue until intra-op cx final result. Plan to transition to PO vs PICC depending on result.  - Pain Regimen: Tylenol for mild pain, Poornima for moderate to severe. Pt reports minimal pain.  - Dressings: 1/4\" iodoform packing, betadine-soaked adaptic, 4x4 gauze, ABD, kerlix, ACE.  - Nursing staff is able to change/reinforce dressing if & as necessary until next day’s dressing change. Thank you.     Other chronic conditions: Will continue home meds for now. Hospitalist team consulted for inpatient medical management.     DVT ppx: SCDs.  Weightbearing: WBAT in CAM boot.  Discharge: Pt to follow up 1 week after discharge with Dr. Savage.     Case to be discussed with attending, A&P above reflects a tentative plan. Please await for the final signature from the attending physician on service Dr. Figueroa.    Darwin Mejias DPM PGY-3  Podiatric Medicine & Surgery  [place]            SIGNATURE: Darwin Mejias DPM PATIENT NAME: Lazarus Truong   DATE: July 18, 2024 MRN: [id number]   TIME: 3:46 PM CONTACT: Haiku Message      "

## 2024-07-18 NOTE — PROGRESS NOTES
Lazarus Truong is a 53 y.o. male on day 2 of admission presenting with Foot infection.      Subjective   Patient resting in bed comfortably. Denies pain to foot.        Objective     Last Recorded Vitals  /55 (BP Location: Left arm, Patient Position: Lying)   Pulse 70   Temp 36.8 °C (98.2 °F) (Temporal)   Resp 18   Wt (!) 161 kg (355 lb)   SpO2 96%   Intake/Output last 3 Shifts:    Intake/Output Summary (Last 24 hours) at 7/18/2024 1446  Last data filed at 7/18/2024 1300  Gross per 24 hour   Intake 2050 ml   Output 1500 ml   Net 550 ml       Admission Weight  Weight: (!) 161 kg (355 lb) (07/16/24 1209)    Daily Weight  07/16/24 : (!) 161 kg (355 lb)    Image Results  MR ankle right wo IV contrast  Narrative: Interpreted By:  Renard Byrd and Sullivan Shannon   STUDY:  MRI of the  right ankle without IV contrast;  7/16/2024 6:51 pm      INDICATION:  Signs/Symptoms:Abscess. History of Charcot ankle with status post  multiple surgeries including ankle reconstruction with talectomy and  tibiocalcaneal arthrodesis.      COMPARISON:  Right foot radiograph 07/16/2024, CT ankle 08/25/2023.      ACCESSION NUMBER(S):  [id number]      ORDERING CLINICIAN:  DERRICK MONTANO      TECHNIQUE:  MR imaging of the  right ankle was obtained  without administration  of intravenous contrast medium.      FINDINGS:  LIMITATIONS:  Somewhat suboptimal evaluation due to the absence of intravenous  contrast.      BONES, MARROW, AND JOINTS:      Extensive end-stage, destructive, erosive, and bony productive  changes throughout the ankle and midfoot, consistent with Charcot  ankle and foot arthropathy. There are postsurgical changes from  Charcot ankle reconstruction, including talectomy and intramedullary  bone graft from the tibia through to the calcaneus. The bone graft is  fragmented with destruction of the bone graft material. There is  marked cortical thickening, likely bony remodeling, in the distal  tibia with abnormal  loss of T1 marrow signal in the visualized distal  tibia. There is serpiginous peripheral STIR hyperintense signal and  central STIR hypointense signal in the distal tibia superior to the  intramedullary johan, as seen on image 16 of STIR sagittal images.      The inferior aspect of the intramedullary bone graft protrudes beyond  the calcaneus and into the flexor digitorum musculature. There is  extensive heterotopic ossification about the ankle and midfoot. There  is focal marrow edema about the superior aspect of the intramedullary  bone graft in the distal tibia, as seen in image 6 of the axial T2  sequence. Of note the entirety of the abnormal signal intensity in  the tibia is incompletely included in the field of view.      There is resection of the distal fibula. There is thickening of the  periosteum the distal fibula without definitive reactive marrow  change and likely related to chronic reactive periostitis.      There is patchy marrow edema with associated loss of normal T1 marrow  signal throughout the calcaneus.          SOFT TISSUES:      Immediately inferior to this area of marrow edema extending medially  from the superior aspect of the intramedullary johan, there is a  heterogeneously T2 hyperintense tubular tract coursing through the  cortex into the adjacent medial ankle soft tissues anterior to the  posterior tibialis tendon, best seen in image 8 of the axial T2  images and image 20 of the coronal T2 images. This tubular tract  likely represents a ghost tract from prior external fixation hardware  previously seen on the CT from 08/25/2023. The tubular tract measures  approximately 3.1 x 0.7 cm in the transverse dimension and 0.7 cm in  the craniocaudal dimension.      The medial-most margin of this tract appears to connect with a  lobulated loculated fluid collection that demonstrates heterogeneous  T2 hyperintense signal and heterogeneous intermediate T1 signal,. The  fluid collection extends to the  skin surface where there is bulging,  skin thickening, and marked focal medial ankle subcutaneous edema.  There are few internal foci of T1 and T2 hypointensity with may  reflect areas of mineralization/calcification or possibly gas, as  seen on image 14 of the axial T2 images, given the extension to the  skin surface. The superior portion of the fluid collection that  extends along the medial ankle subcutaneous tissues adjacent to the  posterior tibialis and flexor digitorum tendons measures  approximately 2.3 x 0.8 cm in the axial dimension and 2.8 cm in the  craniocaudal dimension, as seen on image 8 of the axial T2 images and  series 23 of the coronal T2 images. The dominant inferior most  portion of the collection measures approximately 4.0 x 1.9 cm in the  axial dimension and 3.9 cm in the craniocaudal dimension, as seen on  image 13 of the axial T2 images and image 9 of the sagittal STIR  images.      There is diffuse subcutaneous edema throughout the visualized ankle  and midfoot. Diffuse ill-defined increased T2 signal intensity is  seen in the musculature greatest in the plantar foot musculature  adjacent to the wound/fistula tract as seen on this noncontrast  examination. In the soft tissues of the anterolateral ankle between  the tibia and fibula as seen at image 6 of the axial 19 of the  coronal and 22 of the sagittal plane there is a high T2 low T1 signal  intensity collection measuring a proximally 1.4 x 2.1 x 1.5 cm.      There is moderate posterior tibialis tendinosis and tenosynovitis,  severe peroneal tendinosis, and mild Achilles tendinosis. There is  nonvisualization of the peroneal tendons beyond the level of the  calcaneus, which may reflect postsurgical or traumatic tendon tearing.      Impression: Limited exam due to absence of intravenous contrast. Within the  limitations:  1. Findings which are felt to represent high likelihood of  osteomyelitis involving the distal tibia through to the  calcaneus  with tract through a cloaca versus whole tract from prior drilling of  the distal medial tibial metaphyseal cortex which communicates with a  fistula tract with soft tissue collection of the medial distal leg  with overlying wound/ulcer at the posteromedial distal leg/ankle. The  fistula tract contains heterogeneous fluid suspicious for abscess  formation along the fistula tract. Multiple foci of susceptibility  artifact within the fistula tract may be related to gas which could  be related to the open nature of the process. There is also an ulcer  at the plantar hindfoot into midfoot with fistula tract to the  surface of the calcaneal opening for the arthrodesis. There is  associated high likelihood of osteomyelitis of the fragmented bone  graft material within the tibia through to the calcaneus. Small  volume of fluid in the remnant region of the ankle joint is of  indeterminate sterility but given the above findings, septic  arthritis may be present.  2. 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  3. Cellulitis and/or lymphedema the visualized lower extremity. There  is possibly a collection of the anterolateral ankle which may  represent an abscess versus a region of focal cellulitis versus  communication of the ankle joint up along the lateral surface of the  distal tibia.  4. Edema in the musculature which may be related to ischemic/diabetic  myopathy although a component of infectious myositis particularly  adjacent to the soft tissue tract of the plantar foot is not entirely  excluded.  5. Consideration of repeat MRI with the administration of gadolinium  base contrast may be helpful. Furthermore the entirety of the process  is incompletely included in the field of view at least with regards  to the tibial signal intensity and MRI of the tibia and fibula is  recommended to assess proximal extent of the process.      I personally reviewed the  images/study and I agree with the findings  as stated. This study was interpreted at [facility], [place], [place].      MACRO:  None      Signed by: Renard Byrd 7/17/2024 12:08 PM  Dictation workstation:   [id number]      Physical Exam  Constitutional:       Appearance: He is obese.   Cardiovascular:      Rate and Rhythm: Normal rate and regular rhythm.      Pulses: Normal pulses.   Pulmonary:      Effort: Pulmonary effort is normal.      Breath sounds: Normal breath sounds.   Abdominal:      General: Bowel sounds are normal.      Palpations: Abdomen is soft.   Musculoskeletal:         General: Normal range of motion.   Skin:     General: Skin is warm and dry.      Comments: Right foot with dressing dry and intact   Neurological:      Mental Status: He is alert and oriented to person, place, and time.         Relevant Results             Results for orders placed or performed during the hospital encounter of 07/16/24 (from the past 24 hour(s))   Tissue/Wound Culture/Smear    Specimen: BONE BIOPSY (DECAL); Tissue   Result Value Ref Range    Gram Stain No polymorphonuclear leukocytes seen     Gram Stain No organisms seen    POCT GLUCOSE   Result Value Ref Range    POCT Glucose 90 74 - 99 mg/dL   POCT GLUCOSE   Result Value Ref Range    POCT Glucose 159 (H) 74 - 99 mg/dL   Vancomycin   Result Value Ref Range    Vancomycin 12.7 10.0 - 20.0 ug/mL   POCT GLUCOSE   Result Value Ref Range    POCT Glucose 142 (H) 74 - 99 mg/dL   POCT GLUCOSE   Result Value Ref Range    POCT Glucose 135 (H) 74 - 99 mg/dL       Assessment/Plan                  Principal Problem:    Foot infection  Active Problems:    Lymphedema    Foot abscess, right    Cellulitis of right foot    Foot infection  Continue antibiotics per podiatry, IV Vanco/cefepime  Surgery 7/17/2024, surgery on Friday to close wound  Patient confirms he does not take any home medications    DVT  Prophylaxis  Sequential teds  Ambulate ad  celina     Plan of Care   Patient will be discharged home without any needs possibly tomorrow after surgical intervention.     Plan of care discussed with patient and collaborating physician, Dr. Mckeon.              JESSICA Mabry-CNP

## 2024-07-19 ENCOUNTER — PHARMACY VISIT (OUTPATIENT)
Dept: PHARMACY | Facility: CLINIC | Age: 54
End: 2024-07-19
Payer: MEDICAID

## 2024-07-19 VITALS
TEMPERATURE: 97.5 F | BODY MASS INDEX: 52.42 KG/M2 | RESPIRATION RATE: 18 BRPM | OXYGEN SATURATION: 97 % | SYSTOLIC BLOOD PRESSURE: 126 MMHG | DIASTOLIC BLOOD PRESSURE: 74 MMHG | HEART RATE: 66 BPM | WEIGHT: 315 LBS

## 2024-07-19 LAB
ANION GAP SERPL CALC-SCNC: 8 MMOL/L
BUN SERPL-MCNC: 18 MG/DL (ref 8–25)
CALCIUM SERPL-MCNC: 8.6 MG/DL (ref 8.5–10.4)
CHLORIDE SERPL-SCNC: 102 MMOL/L (ref 97–107)
CO2 SERPL-SCNC: 24 MMOL/L (ref 24–31)
CREAT SERPL-MCNC: 1 MG/DL (ref 0.4–1.6)
EGFRCR SERPLBLD CKD-EPI 2021: 89 ML/MIN/1.73M*2
ERYTHROCYTE [DISTWIDTH] IN BLOOD BY AUTOMATED COUNT: 13.1 % (ref 11.5–14.5)
GLUCOSE SERPL-MCNC: 171 MG/DL (ref 65–99)
HCT VFR BLD AUTO: 38.9 % (ref 41–52)
HGB BLD-MCNC: 12.1 G/DL (ref 13.5–17.5)
LABORATORY COMMENT REPORT: NORMAL
MCH RBC QN AUTO: 27.1 PG (ref 26–34)
MCHC RBC AUTO-ENTMCNC: 31.1 G/DL (ref 32–36)
MCV RBC AUTO: 87 FL (ref 80–100)
NRBC BLD-RTO: 0 /100 WBCS (ref 0–0)
PATH REPORT.FINAL DX SPEC: NORMAL
PATH REPORT.GROSS SPEC: NORMAL
PATH REPORT.RELEVANT HX SPEC: NORMAL
PATH REPORT.TOTAL CANCER: NORMAL
PLATELET # BLD AUTO: 216 X10*3/UL (ref 150–450)
POTASSIUM SERPL-SCNC: 4.8 MMOL/L (ref 3.4–5.1)
RBC # BLD AUTO: 4.46 X10*6/UL (ref 4.5–5.9)
SODIUM SERPL-SCNC: 134 MMOL/L (ref 133–145)
WBC # BLD AUTO: 5.5 X10*3/UL (ref 4.4–11.3)

## 2024-07-19 PROCEDURE — RXMED WILLOW AMBULATORY MEDICATION CHARGE

## 2024-07-19 PROCEDURE — 80048 BASIC METABOLIC PNL TOTAL CA: CPT | Performed by: NURSE PRACTITIONER

## 2024-07-19 PROCEDURE — 2500000004 HC RX 250 GENERAL PHARMACY W/ HCPCS (ALT 636 FOR OP/ED): Mod: JZ

## 2024-07-19 PROCEDURE — 85027 COMPLETE CBC AUTOMATED: CPT | Performed by: NURSE PRACTITIONER

## 2024-07-19 PROCEDURE — 36415 COLL VENOUS BLD VENIPUNCTURE: CPT | Performed by: NURSE PRACTITIONER

## 2024-07-19 RX ORDER — DOXYCYCLINE 100 MG/1
100 TABLET ORAL 2 TIMES DAILY
Qty: 20 TABLET | Refills: 0 | Status: SHIPPED | OUTPATIENT
Start: 2024-07-19 | End: 2024-07-29

## 2024-07-19 RX ORDER — ACETAMINOPHEN 325 MG/1
650 TABLET ORAL EVERY 6 HOURS PRN
Qty: 30 TABLET | Refills: 0 | Status: SHIPPED | OUTPATIENT
Start: 2024-07-19

## 2024-07-19 ASSESSMENT — COGNITIVE AND FUNCTIONAL STATUS - GENERAL
MOBILITY SCORE: 24
DAILY ACTIVITIY SCORE: 24

## 2024-07-19 ASSESSMENT — PAIN SCALES - GENERAL: PAINLEVEL_OUTOF10: 0 - NO PAIN

## 2024-07-19 NOTE — CARE PLAN
The patient's goals for the shift include  IV antibiotics, rest comfort  Problem: Pain - Adult  Goal: Verbalizes/displays adequate comfort level or baseline comfort level  Outcome: Progressing     Problem: Safety - Adult  Goal: Free from fall injury  Outcome: Progressing     Problem: Discharge Planning  Goal: Discharge to home or other facility with appropriate resources  Outcome: Progressing     Problem: Chronic Conditions and Co-morbidities  Goal: Patient's chronic conditions and co-morbidity symptoms are monitored and maintained or improved  Outcome: Progressing       The clinical goals for the shift include IV antibiotics, rest and comfort

## 2024-07-19 NOTE — PROGRESS NOTES
Lazarus Truong is a 53 y.o. male on day 3 of admission presenting with Foot infection.      Subjective   Patient denies pain or sob.        Objective     Last Recorded Vitals  /74 (BP Location: Right arm, Patient Position: Lying)   Pulse 66   Temp 36.4 °C (97.5 °F) (Temporal)   Resp 18   Wt (!) 161 kg (355 lb)   SpO2 97%   Intake/Output last 3 Shifts:    Intake/Output Summary (Last 24 hours) at 7/19/2024 1221  Last data filed at 7/19/2024 0843  Gross per 24 hour   Intake 550 ml   Output 1825 ml   Net -1275 ml       Admission Weight  Weight: (!) 161 kg (355 lb) (07/16/24 1209)    Daily Weight  07/16/24 : (!) 161 kg (355 lb)    Image Results  MR ankle right wo IV contrast  Narrative: Interpreted By:  Renard Byrd and Sullivan Shannon   STUDY:  MRI of the  right ankle without IV contrast;  7/16/2024 6:51 pm      INDICATION:  Signs/Symptoms:Abscess. History of Charcot ankle with status post  multiple surgeries including ankle reconstruction with talectomy and  tibiocalcaneal arthrodesis.      COMPARISON:  Right foot radiograph 07/16/2024, CT ankle 08/25/2023.      ACCESSION NUMBER(S):  [id number]      ORDERING CLINICIAN:  DERRICK MONTANO      TECHNIQUE:  MR imaging of the  right ankle was obtained  without administration  of intravenous contrast medium.      FINDINGS:  LIMITATIONS:  Somewhat suboptimal evaluation due to the absence of intravenous  contrast.      BONES, MARROW, AND JOINTS:      Extensive end-stage, destructive, erosive, and bony productive  changes throughout the ankle and midfoot, consistent with Charcot  ankle and foot arthropathy. There are postsurgical changes from  Charcot ankle reconstruction, including talectomy and intramedullary  bone graft from the tibia through to the calcaneus. The bone graft is  fragmented with destruction of the bone graft material. There is  marked cortical thickening, likely bony remodeling, in the distal  tibia with abnormal loss of T1 marrow signal in  the visualized distal  tibia. There is serpiginous peripheral STIR hyperintense signal and  central STIR hypointense signal in the distal tibia superior to the  intramedullary johan, as seen on image 16 of STIR sagittal images.      The inferior aspect of the intramedullary bone graft protrudes beyond  the calcaneus and into the flexor digitorum musculature. There is  extensive heterotopic ossification about the ankle and midfoot. There  is focal marrow edema about the superior aspect of the intramedullary  bone graft in the distal tibia, as seen in image 6 of the axial T2  sequence. Of note the entirety of the abnormal signal intensity in  the tibia is incompletely included in the field of view.      There is resection of the distal fibula. There is thickening of the  periosteum the distal fibula without definitive reactive marrow  change and likely related to chronic reactive periostitis.      There is patchy marrow edema with associated loss of normal T1 marrow  signal throughout the calcaneus.          SOFT TISSUES:      Immediately inferior to this area of marrow edema extending medially  from the superior aspect of the intramedullary johan, there is a  heterogeneously T2 hyperintense tubular tract coursing through the  cortex into the adjacent medial ankle soft tissues anterior to the  posterior tibialis tendon, best seen in image 8 of the axial T2  images and image 20 of the coronal T2 images. This tubular tract  likely represents a ghost tract from prior external fixation hardware  previously seen on the CT from 08/25/2023. The tubular tract measures  approximately 3.1 x 0.7 cm in the transverse dimension and 0.7 cm in  the craniocaudal dimension.      The medial-most margin of this tract appears to connect with a  lobulated loculated fluid collection that demonstrates heterogeneous  T2 hyperintense signal and heterogeneous intermediate T1 signal,. The  fluid collection extends to the skin surface where there is  bulging,  skin thickening, and marked focal medial ankle subcutaneous edema.  There are few internal foci of T1 and T2 hypointensity with may  reflect areas of mineralization/calcification or possibly gas, as  seen on image 14 of the axial T2 images, given the extension to the  skin surface. The superior portion of the fluid collection that  extends along the medial ankle subcutaneous tissues adjacent to the  posterior tibialis and flexor digitorum tendons measures  approximately 2.3 x 0.8 cm in the axial dimension and 2.8 cm in the  craniocaudal dimension, as seen on image 8 of the axial T2 images and  series 23 of the coronal T2 images. The dominant inferior most  portion of the collection measures approximately 4.0 x 1.9 cm in the  axial dimension and 3.9 cm in the craniocaudal dimension, as seen on  image 13 of the axial T2 images and image 9 of the sagittal STIR  images.      There is diffuse subcutaneous edema throughout the visualized ankle  and midfoot. Diffuse ill-defined increased T2 signal intensity is  seen in the musculature greatest in the plantar foot musculature  adjacent to the wound/fistula tract as seen on this noncontrast  examination. In the soft tissues of the anterolateral ankle between  the tibia and fibula as seen at image 6 of the axial 19 of the  coronal and 22 of the sagittal plane there is a high T2 low T1 signal  intensity collection measuring a proximally 1.4 x 2.1 x 1.5 cm.      There is moderate posterior tibialis tendinosis and tenosynovitis,  severe peroneal tendinosis, and mild Achilles tendinosis. There is  nonvisualization of the peroneal tendons beyond the level of the  calcaneus, which may reflect postsurgical or traumatic tendon tearing.      Impression: Limited exam due to absence of intravenous contrast. Within the  limitations:  1. Findings which are felt to represent high likelihood of  osteomyelitis involving the distal tibia through to the calcaneus  with tract through  a cloaca versus whole tract from prior drilling of  the distal medial tibial metaphyseal cortex which communicates with a  fistula tract with soft tissue collection of the medial distal leg  with overlying wound/ulcer at the posteromedial distal leg/ankle. The  fistula tract contains heterogeneous fluid suspicious for abscess  formation along the fistula tract. Multiple foci of susceptibility  artifact within the fistula tract may be related to gas which could  be related to the open nature of the process. There is also an ulcer  at the plantar hindfoot into midfoot with fistula tract to the  surface of the calcaneal opening for the arthrodesis. There is  associated high likelihood of osteomyelitis of the fragmented bone  graft material within the tibia through to the calcaneus. Small  volume of fluid in the remnant region of the ankle joint is of  indeterminate sterility but given the above findings, septic  arthritis may be present.  2. 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  3. Cellulitis and/or lymphedema the visualized lower extremity. There  is possibly a collection of the anterolateral ankle which may  represent an abscess versus a region of focal cellulitis versus  communication of the ankle joint up along the lateral surface of the  distal tibia.  4. Edema in the musculature which may be related to ischemic/diabetic  myopathy although a component of infectious myositis particularly  adjacent to the soft tissue tract of the plantar foot is not entirely  excluded.  5. Consideration of repeat MRI with the administration of gadolinium  base contrast may be helpful. Furthermore the entirety of the process  is incompletely included in the field of view at least with regards  to the tibial signal intensity and MRI of the tibia and fibula is  recommended to assess proximal extent of the process.      I personally reviewed the images/study and I agree with the  findings  as stated. This study was interpreted at [facility], [place], [place].      MACRO:  None      Signed by: Renard Byrd 7/17/2024 12:08 PM  Dictation workstation:   [id number]      Physical Exam  Skin:     Comments: Right foot with dressing dry and intact         Relevant Results             Results for orders placed or performed during the hospital encounter of 07/16/24 (from the past 24 hour(s))   POCT GLUCOSE   Result Value Ref Range    POCT Glucose 163 (H) 74 - 99 mg/dL   CBC   Result Value Ref Range    WBC 5.5 4.4 - 11.3 x10*3/uL    nRBC 0.0 0.0 - 0.0 /100 WBCs    RBC 4.46 (L) 4.50 - 5.90 x10*6/uL    Hemoglobin 12.1 (L) 13.5 - 17.5 g/dL    Hematocrit 38.9 (L) 41.0 - 52.0 %    MCV 87 80 - 100 fL    MCH 27.1 26.0 - 34.0 pg    MCHC 31.1 (L) 32.0 - 36.0 g/dL    RDW 13.1 11.5 - 14.5 %    Platelets 216 150 - 450 x10*3/uL   Basic Metabolic Panel   Result Value Ref Range    Glucose 171 (H) 65 - 99 mg/dL    Sodium 134 133 - 145 mmol/L    Potassium 4.8 3.4 - 5.1 mmol/L    Chloride 102 97 - 107 mmol/L    Bicarbonate 24 24 - 31 mmol/L    Urea Nitrogen 18 8 - 25 mg/dL    Creatinine 1.00 0.40 - 1.60 mg/dL    eGFR 89 >60 mL/min/1.73m*2    Calcium 8.6 8.5 - 10.4 mg/dL    Anion Gap 8 <=19 mmol/L       Assessment/Plan                  Principal Problem:    Foot infection  Active Problems:    Lymphedema    Foot abscess, right    Cellulitis of right foot    Foot infection  Continue antibiotics per podiatry, IV Vanco/cefepime  Surgery 7/17/2024,surgery today to close wound  Has been N.p.o. after midnight  Patient confirms he does not take any home medications  DVT  Prophylaxis  Sequential teds  Ambulate ad celina     Plan of Care   Patient will be discharged home without any needs possibly tomorrow after surgical intervention.      Plan of care discussed with patient and collaborating physician, Dr. Sarwat Maria, APRN-CNP

## 2024-07-19 NOTE — DISCHARGE INSTRUCTIONS
PODIATRY DISCHARGE INSTRUCTIONS  Please call today to schedule appointment with Dr. Savage for Tuesday in [facility] after discharge or sooner if any problems or questions arise.  May weightbear as tolerated in CROW boot  Keep dressing clean and dry until your first follow up appointment.  Do not shower unless using a cast protector to protect dressing.  Elevate surgical extremity as much as possible to help with pain and swelling.  Should any problems, questions, or concerns arise, please call the clinic office.

## 2024-07-19 NOTE — PROGRESS NOTES
07/19/24 1402   Discharge Planning   Expected Discharge Disposition Home   Does the patient need discharge transport arranged? No     Back to OR today to close wound.  Should have no needs at discharge.

## 2024-07-19 NOTE — DISCHARGE SUMMARY
Discharge Diagnosis  Foot infection    Issues Requiring Follow-Up  PODIATRY DISCHARGE INSTRUCTIONS  Please call [phone] to schedule appointment with Dr. Savage for follow-up of ulcer on Tuesday next week or sooner if any problems or questions arise.  Bear weight as tolerated in CROW boot. Encourage rest and elevation.  Keep dressing clean and dry until your first follow up appointment.  Do not shower unless using a cast protector to protect dressing.  If dressing gets wet, change or call office immediately as this can lead to increased risk of infection.  Elevate surgical extremity as much as possible to help with pain and swelling.  Should any problems, questions, or concerns arise, please call the clinic office.     Test Results Pending At Discharge  Pending Labs       Order Current Status    Surgical Pathology Exam In process    Blood Culture Preliminary result    Blood Culture Preliminary result    Tissue/Wound Culture/Smear Preliminary result    Tissue/Wound Culture/Smear Preliminary result            Hospital Course  Patient is a 53-year-old male with PMH of A-fib on Eliquis CAD with h/o STEMI, DM2, morbid obesity, lymphedema, Charcot ankle who presented to [facility] emergency department for evaluation of possible infection to right foot. Patient had multiple surgeries to the right foot including charcot reconstruction in with talectomy, tibiocalcaneal arthrodesis, with application of multiplanar ex fix RLE(6/2023), removal of ex fix with debridement of soft tissue and bone RLE (11/2023), wound debridement RLE and excision of lower extremity lesion (1/2024), and most recently excision of ulceration with advancement flap closure of chronic ulceration last week (7/10/24). He follows closely with podiatry Dr. Savage who sent him to the hospital 7/16 with concern for underlying infection as they did drain possible abscess in the foot. At the ED, X-rays were taken indicating no acute osseous abnormality, MRI  w/o contrast ordered. ESR 98, CRP of 3.0, no white count, stable vitals. He was seen and admitted by podiatry, hospitalist consulted for medical management, made NPO overnight and discussed incision and drainage of abscess on right foot on 7/17. He was started on IV vancomycin and cefepime scheduled. On 7/17, incision and drainage of right foot performed with tracking appreciated near bone so a bone culture was taken, care transition confirmed patient plan to be discharged home. On 7/18, wound packing replaced by podiatry, stable appearing, no change in normal white count, await culture results for PO vs IV abx with PICC decision, which would involve ID consult. On 7/19, reviewed culture results. Staph aureus positive swab culture noted indicating only 1 colony with another culture negative. Called pathology lab and they indicated that after 2 days a pathologist was not assigned to the case yet so the culture was prepped but not seen or reviewed by pathology with estimate for result likely on Monday. Wound packing removed, dressing changed, incisional wound appearing stable. Patient told to keep dressing in place until outpatient follow-up with podiatry on Tuesday, may weightbear as tolerated as long as he has his CROW boot on but encouraged rest and elevation of right foot for edema control. Plan to discharge on PO doxycycline 100 mg BID for 10 days and await path results as an outpatient to cover for MRSA. If a positive result is found, will refer patient to ID for PICC line placement at that time.    Pertinent Physical Exam At Time of Discharge  Physical Exam  Constitutional:       General: He is not in acute distress.     Appearance: Normal appearance. He is obese. He is not ill-appearing, toxic-appearing or diaphoretic.   Pulmonary:      Effort: Pulmonary effort is normal.   Abdominal:      Palpations: Abdomen is soft.   Musculoskeletal:      Right lower leg: Edema present.   Skin:     General: Skin is warm and  dry.      Capillary Refill: Capillary refill takes less than 2 seconds.   Neurological:      Mental Status: He is alert and oriented to person, place, and time.         Patient is AOx3 and in no acute distress. Patient is alert and cooperative. Sitting comfortably in bed with dressing clean, dry and intact. Post-surgical dressing in place right foot w/o strikethrough.     Vascular: Faintly palpable DP/PT pulses B/L. Severe pitting edema noted B/L. Hair growth absent B/L. CFT<5 to B/L hallux. Temperature is warm to cool from tibial tuberosity to distal digits B/L. No lymphatic streaking noted B/L.     Musculoskeletal: Gross active and passive ROM intact to age and activity level except at right ankle. Moves all extremities spontaneously. Pain to palpation right medial heel     Neurological: Intact light touch sensation B/L. Pain stimuli diminished B/L. Denies any numbness, burning or tingling.     Dermatologic: Skin appears taut but well hydrated B/L. Web spaces 1-4 B/L are clean, dry and intact. No rashes or nodules noted B/L. No hyperkeratotic tissue noted B/L.  Incision to dorsomedial right foot with skin edges well-coapted with sutures intact. Incision to right ankle posterior ankle medially with 4 cm long with granular base with minimal slough, non-macerated heidy-incision, minimal heidy-incisional erythema, no mild-moderate serosanguinous drainage without noted purulence. Erythema at this site which does not extend proximally,focal calor noted,minimal malodor.    Home Medications     Medication List      START taking these medications     doxycycline 50 mg tablet; Commonly known as: Adoxa; Take 2 tablets (100   mg) by mouth 2 times a day for 10 days. Take with a full glass of water   and do not lie down for at least 30 minutes after.     CONTINUE taking these medications     apixaban 5 mg tablet; Commonly known as: Eliquis   docusate sodium 100 mg capsule; Commonly known as: Colace   lisinopril 5 mg tablet    metFORMIN (OSM) 500 mg 24 hr tablet; Commonly known as: Fortamet     ASK your doctor about these medications     oxyCODONE-acetaminophen 5-325 mg tablet; Commonly known as: Percocet;   Take 1 tablet by mouth every 6 hours if needed for severe pain (7 - 10)   for up to 3 days.; Ask about: Should I take this medication?       Outpatient Follow-Up  Future Appointments   Date Time Provider Department Center   7/25/2024  2:30 PM JOSE A [facility]USER 2 [facility] Mejias, JANE/[facility]

## 2024-07-19 NOTE — CARE PLAN
The patient's goals for the shift include IV antibiotics     The clinical goals for the shift include IV antibiotics, rest and comfort      Problem: Pain - Adult  Goal: Verbalizes/displays adequate comfort level or baseline comfort level  Outcome: Progressing     Problem: Safety - Adult  Goal: Free from fall injury  Outcome: Progressing     Problem: Discharge Planning  Goal: Discharge to home or other facility with appropriate resources  Outcome: Progressing     Problem: Chronic Conditions and Co-morbidities  Goal: Patient's chronic conditions and co-morbidity symptoms are monitored and maintained or improved  Outcome: Progressing

## 2024-07-20 LAB
BACTERIA BLD CULT: NORMAL
BACTERIA BLD CULT: NORMAL
BACTERIA SPEC CULT: ABNORMAL
BACTERIA SPEC CULT: NORMAL
GRAM STN SPEC: ABNORMAL
GRAM STN SPEC: ABNORMAL
GRAM STN SPEC: NORMAL
GRAM STN SPEC: NORMAL

## 2024-07-25 ENCOUNTER — APPOINTMENT (OUTPATIENT)
Dept: VASCULAR MEDICINE | Facility: HOSPITAL | Age: 54
End: 2024-07-25
Payer: MEDICAID

## 2024-08-02 ENCOUNTER — HOSPITAL ENCOUNTER (OUTPATIENT)
Dept: VASCULAR MEDICINE | Facility: HOSPITAL | Age: 54
Discharge: HOME | End: 2024-08-02
Payer: MEDICAID

## 2024-08-02 DIAGNOSIS — I96 GANGRENE, NOT ELSEWHERE CLASSIFIED (MULTI): ICD-10-CM

## 2024-08-02 DIAGNOSIS — I73.9 PERIPHERAL VASCULAR DISEASE, UNSPECIFIED (CMS-HCC): ICD-10-CM

## 2024-08-02 DIAGNOSIS — I77.1 STRICTURE OF ARTERY (CMS-HCC): ICD-10-CM

## 2024-08-02 PROCEDURE — 93923 UPR/LXTR ART STDY 3+ LVLS: CPT

## 2024-08-02 PROCEDURE — 93923 UPR/LXTR ART STDY 3+ LVLS: CPT | Performed by: INTERNAL MEDICINE

## (undated) DEVICE — TUBING, IRRIGATION, HIGH FLOW, HAND PIECE SET

## (undated) DEVICE — BANDAGE, ADHESIVE, STRIP, FLEXIBLE, CURITY, 2 X 3.5 IN, FABRIC

## (undated) DEVICE — PADDING, CAST, WYTEX, 4 IN X 4 YD, LF

## (undated) DEVICE — GLOVE, SURGICAL, PROTEXIS PI ORTHO, 7.5, PF, LF

## (undated) DEVICE — SLEEVE, VASO PRESS, CALF GARMENT, MEDIUM, GREEN

## (undated) DEVICE — Device

## (undated) DEVICE — SOLUTION, INJECTION, SODIUM CHLORIDE 9%, 3000ML

## (undated) DEVICE — SUTURE, ETHILON, 3-0, 18 IN, PS1, BLACK

## (undated) DEVICE — BANDAGE, ELASTIC, PREMIUM, SELF-CLOSE, 6 IN X 5.5 YD, STERILE

## (undated) DEVICE — DRESSING, GAUZE, PETROLATUM, XEROFORM, 5 X 9 IN, STERILE

## (undated) DEVICE — SPONGE, LAP, XRAY DECT, 18IN X 18IN, W/MASTER DMT, STERILE

## (undated) DEVICE — PODIATRY CUSTOM PACK

## (undated) DEVICE — SYRINGE, 60 CC, IRRIGATION, BULB, CONTRO-BULB, PAPER POUCH

## (undated) DEVICE — COVER, XRAY, CASSETTE, 21 X 40 IN, STERILE

## (undated) DEVICE — DRAPE, TIBURON, SPLIT SHEET, REINF ADH STRIP, 77X108

## (undated) DEVICE — OINTMENT, TOPICAL, BACITRACIN

## (undated) DEVICE — DRESSING, NON-ADHERENT, OIL EMULSION, CURITY, 3 X 8 IN, STERILE

## (undated) DEVICE — DRESSING, ABDOMINAL, TENDERSORB, 8 X 7-1/2 IN, STERILE

## (undated) DEVICE — NEEDLE, BIOPSY, T- HANDLE, 8G X 4

## (undated) DEVICE — PACKING, PLAIN, CURITY, 0.25 IN X 5 YD, STERILE

## (undated) DEVICE — DRAPE COVER, C ARM, FLOUROSCAN IMAGING SYS